# Patient Record
Sex: MALE | Race: WHITE | URBAN - METROPOLITAN AREA
[De-identification: names, ages, dates, MRNs, and addresses within clinical notes are randomized per-mention and may not be internally consistent; named-entity substitution may affect disease eponyms.]

---

## 2017-01-02 ENCOUNTER — MEDICAL CORRESPONDENCE (OUTPATIENT)
Dept: HEALTH INFORMATION MANAGEMENT | Facility: CLINIC | Age: 82
End: 2017-01-02

## 2017-01-04 ENCOUNTER — TRANSFERRED RECORDS (OUTPATIENT)
Dept: HEALTH INFORMATION MANAGEMENT | Facility: CLINIC | Age: 82
End: 2017-01-04

## 2017-03-27 ENCOUNTER — TELEPHONE (OUTPATIENT)
Dept: FAMILY MEDICINE | Facility: CLINIC | Age: 82
End: 2017-03-27

## 2017-03-27 DIAGNOSIS — I10 ESSENTIAL HYPERTENSION WITH GOAL BLOOD PRESSURE LESS THAN 140/90: ICD-10-CM

## 2017-03-27 DIAGNOSIS — I21.3 ACUTE ST ELEVATION MYOCARDIAL INFARCTION (STEMI), UNSPECIFIED ARTERY (H): ICD-10-CM

## 2017-03-27 RX ORDER — LISINOPRIL 2.5 MG/1
2.5 TABLET ORAL DAILY
Qty: 90 TABLET | Refills: 0 | Status: SHIPPED | OUTPATIENT
Start: 2017-03-27 | End: 2017-06-30

## 2017-03-27 NOTE — TELEPHONE ENCOUNTER
[facility]. Please call pt at the above #.

## 2017-03-27 NOTE — TELEPHONE ENCOUNTER
Called patient and patient stated he needs a paper perscription for lisinopril and that is it for now.     Rabia CASTILLO CMA ([facility])

## 2017-03-28 NOTE — TELEPHONE ENCOUNTER
Lisinopril prescription signed by Dr. Wright and mailed to patient's home address.  Patient called and informed. Selina Alfonso,

## 2017-04-04 ENCOUNTER — TRANSFERRED RECORDS (OUTPATIENT)
Dept: HEALTH INFORMATION MANAGEMENT | Facility: CLINIC | Age: 82
End: 2017-04-04

## 2017-04-07 ENCOUNTER — TELEPHONE (OUTPATIENT)
Dept: FAMILY MEDICINE | Facility: CLINIC | Age: 82
End: 2017-04-07

## 2017-04-07 NOTE — TELEPHONE ENCOUNTER
Pt & spouse are disabled, handicap cards are expiring for both. Pt would like to discuss renewing.

## 2017-04-07 NOTE — TELEPHONE ENCOUNTER
The form has been started and placed at the providers desk. Haleigh Myers     Spouse has not been seen by the provider since 2015.     Spouse will call and make an appointment to be seen and will discuss this at that time.

## 2017-04-07 NOTE — TELEPHONE ENCOUNTER
Called patient let know that form was done on are part and he will need to fill in the top portion he would like this mailed to the home address this was done.  Shonda Beatty,

## 2017-06-30 ENCOUNTER — TELEPHONE (OUTPATIENT)
Dept: FAMILY MEDICINE | Facility: CLINIC | Age: 82
End: 2017-06-30

## 2017-06-30 DIAGNOSIS — I10 ESSENTIAL HYPERTENSION WITH GOAL BLOOD PRESSURE LESS THAN 140/90: ICD-10-CM

## 2017-06-30 DIAGNOSIS — I21.3 ACUTE ST ELEVATION MYOCARDIAL INFARCTION (STEMI), UNSPECIFIED ARTERY (H): ICD-10-CM

## 2017-06-30 NOTE — TELEPHONE ENCOUNTER
Lisinopril   Last Written Prescription Date: 3/27/17  Last Fill Quantity: 90, # refills: 0  Last Office Visit with G, P or [facility] prescribing provider: 12/22/16       Potassium   Date Value Ref Range Status   12/22/2016 4.1 3.4 - 5.3 mmol/L Final     Creatinine   Date Value Ref Range Status   12/22/2016 1.62 (H) 0.66 - 1.25 mg/dL Final     BP Readings from Last 3 Encounters:   12/22/16 (!) 82/48   12/05/16 (!) 86/62   06/09/16 94/57

## 2017-07-03 NOTE — TELEPHONE ENCOUNTER
Routing refill request to provider for review/approval because:  Labs out of range:  Cr  Low BP's      Ruth Nichols, RN - [facility]

## 2017-07-03 NOTE — TELEPHONE ENCOUNTER
BP Readings from Last 3 Encounters:   12/22/16 (!) 82/48   12/05/16 (!) 86/62   06/09/16 94/57      Has cardiomyopathy, so blood pressure should be lower than normal  Has been 6 months since last visit  Recommend follow up with Dr. Wright now  Call to schedule, then can fill x 30 days  Fariha Montano MD

## 2017-07-03 NOTE — TELEPHONE ENCOUNTER
Reason for Call:  Other prescription    Detailed comments:  Patient calling on the status of refill.     Phone Number Patient can be reached at: Home number on file [phone] (home)    Best Time:  Any     Can we leave a detailed message on this number? YES    Call taken on 7/3/2017 at 10:12 AM by Angela Nolasco

## 2017-07-05 RX ORDER — LISINOPRIL 2.5 MG/1
TABLET ORAL
Qty: 30 TABLET | Refills: 0 | Status: SHIPPED | OUTPATIENT
Start: 2017-07-05 | End: 2017-07-20

## 2017-07-05 NOTE — TELEPHONE ENCOUNTER
Patient called and informed.  Appointment scheduled with Dr. Wright on Thursday, July 20th at 10:50 a.m.  Routing to RN to refill medication. Selina Alfonso,

## 2017-07-10 ENCOUNTER — TELEPHONE (OUTPATIENT)
Dept: FAMILY MEDICINE | Facility: CLINIC | Age: 82
End: 2017-07-10

## 2017-07-10 DIAGNOSIS — I10 HYPERTENSION GOAL BP (BLOOD PRESSURE) < 140/90: ICD-10-CM

## 2017-07-10 DIAGNOSIS — I50.9 CONGESTIVE HEART FAILURE, UNSPECIFIED CONGESTIVE HEART FAILURE CHRONICITY, UNSPECIFIED CONGESTIVE HEART FAILURE TYPE: Primary | ICD-10-CM

## 2017-07-10 NOTE — TELEPHONE ENCOUNTER
There's a problem with the furosemide [ Lasix ] - his medication administration record lists bumetanide (BUMEX), which is it ? If necessary download / request all recent [facility] progress notes. Maybe he needs this refill with [facility] [place] ?    Gianni Wright MD

## 2017-07-10 NOTE — TELEPHONE ENCOUNTER
furosemide (LASIX) 80 MG tablet     Last Written Prescription Date:  ?  Last Fill Quantity: ?,   # refills: ?  Last Office Visit with [facility], P or [facility] prescribing provider: 12-22-16  Future Office visit:    Next 5 appointments (look out 90 days)     Jul 20, 2017 10:50 AM CDT   Office Visit with Gianni Wright MD   [facility] ([facility])    [address]  [place] [address]   [phone]                   Routing refill request to provider for review/approval because:  Drug not active on patient's medication list

## 2017-07-10 NOTE — TELEPHONE ENCOUNTER
Reason for call:  Medication   If this is a refill request, has the caller requested the refill from the pharmacy already? No  Will the patient be using a [facility]? No  Name of the pharmacy and [facility] the [facility] [phone]    Name of the medication requested: Furosemide 8 MG, patient states Bumex is no longer working and went back to furosemide and it is working much better!    Other request: n/a    Phone number to reach patient:  Home number on file [phone] (home)    Best Time:  anytime    Can we leave a detailed message on this number?  YES

## 2017-07-11 RX ORDER — FUROSEMIDE 80 MG
80 TABLET ORAL DAILY
Qty: 90 TABLET | Refills: 0 | Status: SHIPPED | OUTPATIENT
Start: 2017-07-11 | End: 2017-07-20

## 2017-07-11 NOTE — TELEPHONE ENCOUNTER
Per patient note below:  patient states Bumex is no longer working and went back to furosemide and it is working much better!  Please advise, should RN recommend patient f/u with [facility]?  Belen Hummel RN

## 2017-07-11 NOTE — TELEPHONE ENCOUNTER
Well the choice of furosemide [ Lasix ] over bumetanide (BUMEX) is ok, they are both effective diuretics. But more to the point , how are things going ? I need to see patient or with [facility] [place] . Either way. Extend prescription times one month and schedule follow up either with me or with [facility] [place], it's his call.    Gianni Wright MD

## 2017-07-12 NOTE — PROGRESS NOTES
SUBJECTIVE:                                                    Juan Antonio Sierra is a 81 year old male who presents to clinic today for the following health issues:    Right Leg Edema/CHF -- Patient notes his right ankle are hard and swollen. In addition, his right toes feel numb. He states his sense of balance is bad, but he has  gained muscle back since last December 2016. Compliant with 5 MG Coumadin tiw. He notes he forces himself to do things, which he relates to feeling better.    Depression -- He notes he stopped taking Zoloft. In private, the wife pleaded with me that I convince him to start Zoloft again. However, the patient and I discussed the role of Zoloft in some detail, but he declined. There is some intrafamilial tension surrounding this issue and patient became animated during this discussion     Tobacco Cessation -- Patient states he has a severe desire to smoke, even though he has not smoked in two years.    Hypothyroidism -- Patient states when he started taking his thyroid medication he began losing hair and his nails became rigid. After a while his hair and nails returned to normal.  TSH   Date Value Ref Range Status   11/08/2016 2.75 0.40 - 4.00 mU/L Final   02/17/2016 5.38 (H) mcU/mL Final   11/19/2015 4.49 (H) 0.40 - 4.00 mU/L Final   10/06/2015 6.79 (H) 0.40 - 4.00 mU/L Final   06/04/2015 4.82 mcU/mL Final     Idiopathic Pulmonary Fibrosis -- He reports he does not use his oxygen but does have SOB. He has severe documented hypoxia    Problem list and histories reviewed & adjusted, as indicated.  Additional history: as documented    Patient Active Problem List   Diagnosis     Atrial fibrillation (H)     AMI (acute myocardial infarction) (H)     Arthritis     Hyperlipidemia LDL goal <100     Hypertension goal BP (blood pressure) < 140/90     S/P CABG (coronary artery bypass graft)     Health Care Home     Transient cerebral ischemia     Ex-smoker     Generalized weakness     Paraneoplastic  neuropathy (H)     Pulmonary nodule     High risk medications (not anticoagulants) long-term use     Congestive heart failure, unspecified congestive heart failure chronicity, unspecified congestive heart failure type (H)     Major depressive disorder, recurrent episode, moderate (H)     Other emphysema (H)     Ischemic cardiomyopathy     CKD (chronic kidney disease) stage 3, GFR 30-59 ml/min     IPF (idiopathic pulmonary fibrosis) (H)     Acquired hypothyroidism     Past Surgical History:   Procedure Laterality Date     CATHETER, ABLATION  July 2012    for atrial fibrillation      CORONARY ARTERY BYPASS  1983 (5v)  -  1998 (3v)    cabg @ [facility] and a redo     IMPLANT PACEMAKER  6/2012     Laminectomy - T11 to L5  aug 26th, 2015    [place]     STENT, CORONARY, TREE  6/3/2011    stenting of the LAD by the mammary artery graft in 2011     TONSILLECTOMY         Social History   Substance Use Topics     Smoking status: Former Smoker     Types: Pipe, Cigars     Start date: 1/1/1947     Quit date: 8/18/2015     Smokeless tobacco: Never Used     Alcohol use Yes      Comment: 0-1 drinks of brigitte in evening     Family History   Problem Relation Age of Onset     Respiratory Mother      chf     CEREBROVASCULAR DISEASE Mother 86     HEART DISEASE Father      Neurologic Disorder Sister      migraines/ unknown age         Current Outpatient Prescriptions   Medication Sig Dispense Refill     furosemide (LASIX) 80 MG tablet Take 1 tablet (80 mg) by mouth daily 90 tablet 1     lisinopril (PRINIVIL/ZESTRIL) 2.5 MG tablet Take 1 tablet (2.5 mg) by mouth daily 90 tablet 1     levothyroxine (SYNTHROID, LEVOTHROID) 75 MCG tablet TAKE 1 TABLET (75 MCG) BYMOUTH DAILY 90 tablet 2     carvedilol (COREG) 12.5 MG tablet Take 12.5 mg by mouth 2 times daily (with meals)       warfarin (COUMADIN) 5 MG tablet Take 1/2 tablet (2.5 mg ) daily or as directed by [facility] nurse 90 tablet 1     Ferrous Sulfate (IRON) 325 (65 FE) MG tablet Take 1 tablet by mouth  "daily       MAGNESIUM OXIDE PO        Nitroglycerin (NITROSTAT SL) Place 0.4 mg under the tongue       potassium chloride SA (K-DUR,KLOR-CON M) 20 MEQ tablet Take 1 tablet by mouth daily       fish oil-omega-3 fatty acids (FISH OIL) 1000 MG capsule Take 2 g by mouth daily       Cholecalciferol (VITAMIN D3) 2000 UNITS CAPS 1 daily       Cyanocobalamin (B-12) 2500 MCG TABS 1 daily       Multiple vitamin CAPS 1 daily       amiodarone (PACERONE/CODARONE) 200 MG tablet Take 100 mg by mouth daily        aspirin 81 MG tablet Take 1 tablet by mouth daily.       [DISCONTINUED] furosemide (LASIX) 80 MG tablet Take 1 tablet (80 mg) by mouth daily 90 tablet 0     [DISCONTINUED] lisinopril (PRINIVIL/ZESTRIL) 2.5 MG tablet TAKE 1 TABLET BY MOUTH ONCE DAILY 30 tablet 0     Labs reviewed in EPIC      Reviewed and updated as needed this visit by clinical staff  Tobacco  Allergies  Meds  Med Hx  Surg Hx  Fam Hx  Soc Hx      Reviewed and updated as needed this visit by Provider         ROS:  Constitutional, HEENT, cardiovascular, pulmonary, GI, , musculoskeletal, neuro, skin, endocrine and psych systems are negative, except as otherwise noted.    This document serves as a record of the services and decisions personally performed and made by Gianni Wright MD. It was created on their behalf by Robby Masrhall, a trained medical scribe. The creation of this document is based the provider's statements to the medical scribe.  Robby Marshall July 20, 2017 11:17 AM    OBJECTIVE:   /80 (BP Location: Right arm, Cuff Size: Adult Regular)  Pulse 81  Temp 97  F (36.1  C) (Oral)  Ht 1.753 m (5' 9\")  Wt 75.4 kg (166 lb 3.2 oz)  SpO2 96%  BMI 24.54 kg/m2  Body mass index is 24.54 kg/(m^2).  GENERAL: healthy, alert and no distress  EYES: Eyes grossly normal to inspection, PERRL and conjunctivae and sclerae normal  RESP: expiratory wheezing, more pronounced at the bases  CV: apparently regular rate and rhythm, normal S1 S2, no S3 " or S4, no murmur, click or rub, no peripheral edema and peripheral pulses strong, distant heart tones   SKIN: no suspicious lesions or rashes to visible skin  NEURO: mentation intact and speech normal  PSYCH: mentation appears normal, affect normal/bright    Diagnostic Test Results:  Results for orders placed or performed in visit on 07/20/17 (from the past 24 hour(s))   CBC with platelets differential   Result Value Ref Range    WBC 6.9 4.0 - 11.0 10e9/L    RBC Count 3.68 (L) 4.4 - 5.9 10e12/L    Hemoglobin 12.6 (L) 13.3 - 17.7 g/dL    Hematocrit 37.6 (L) 40.0 - 53.0 %     (H) 78 - 100 fl    MCH 34.2 (H) 26.5 - 33.0 pg    MCHC 33.5 31.5 - 36.5 g/dL    RDW 14.0 10.0 - 15.0 %    Platelet Count 152 150 - 450 10e9/L    Diff Method Automated Method     % Neutrophils 65.8 %    % Lymphocytes 18.2 %    % Monocytes 12.1 %    % Eosinophils 3.5 %    % Basophils 0.4 %    Absolute Neutrophil 4.6 1.6 - 8.3 10e9/L    Absolute Lymphocytes 1.3 0.8 - 5.3 10e9/L    Absolute Monocytes 0.8 0.0 - 1.3 10e9/L    Absolute Eosinophils 0.2 0.0 - 0.7 10e9/L    Absolute Basophils 0.0 0.0 - 0.2 10e9/L       ASSESSMENT/PLAN:     (I50.9) Congestive heart failure, unspecified congestive heart failure chronicity, unspecified congestive heart failure type (H)  (primary encounter diagnosis)  Comment: severe ischemic cardiomyopathy  And is clinging to life at this point   Plan: furosemide (LASIX) 80 MG tablet, Comprehensive         metabolic panel        See office visit notes with [facility] [place]     (I10) Hypertension goal BP (blood pressure) < 140/90  Comment: controlled within acceptable limits   Plan: furosemide (LASIX) 80 MG tablet, Comprehensive         metabolic panel            (I21.3) Acute ST elevation myocardial infarction (STEMI), unspecified artery (H)  Comment: free of anginal symptoms at this time at this point   Plan: lisinopril (PRINIVIL/ZESTRIL) 2.5 MG tablet            (E78.5) Hyperlipidemia LDL  goal <100  Comment: limited data as to efficacy of treatment with lipid lowering therapy with statin medication at this point   Plan: Lipid panel reflex to direct LDL            (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,  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)  Comment: this is a chronic issue and has been seen at [facility]   Plan: CBC with platelets differential, Comprehensive         metabolic panel            (E03.9) Acquired hypothyroidism  Comment: recheck   Plan: TSH with free T4 reflex            (N18.3) CKD (chronic kidney disease) stage 3, GFR 30-59 ml/min  Comment: ongoing monitoring    Plan: Comprehensive metabolic panel            (J43.8) Other emphysema (H)  Comment: continue current plan of care     Plan: Comprehensive metabolic panel              Patient Instructions     - I will follow up with you about lab results.     - Follow up with me in 6 months.     [facility]    If you have any questions regarding to your visit please contact your care team:     Team Pink:   Clinic Hours Telephone Number   Internal Medicine:  Dr. Raina Tran, NP       7am-7pm  Monday - Thursday   7am-5pm  Fridays  [phone]  (Appointment scheduling available 24/7)    Questions about your visit?  Team Line  [phone]   Urgent Care - [place] - 11am-9pm Monday-Friday Saturday-Sunday- 9am-5pm   [place] - 5pm-9pm Monday-Friday Saturday-Sunday- 9am-5pm  [phone] - [place]   [phone] - [place]       What options do I have for visits at the clinic other than the traditional office visit?  To expand how we care for you, many of our providers are utilizing electronic visits (e-visits) and telephone visits, when medically appropriate, for interactions with their patients rather than a visit in the clinic.   We also offer nurse visits for many medical concerns. Just like any other service, we will bill your insurance company for this type of visit based on time  spent on the phone with your provider. Not all insurance companies cover these visits. Please check with your medical insurance if this type of visit is covered. You will be responsible for any charges that are not paid by your insurance.      E-visits via [email or web address]:  generally incur a $35.00 fee.  Telephone visits:  Time spent on the phone: *charged based on time that is spent on the phone in increments of 10 minutes. Estimated cost:   5-10 mins $30.00   11-20 mins. $59.00   21-30 mins. $85.00   Use Outlisten (secure email communication and access to your chart) to send your primary care provider a message or make an appointment. Ask someone on your Team how to sign up for Outlisten.    For a Price Quote for your services, please call our Consumer Price Line at [phone].    As always, Thank you for trusting us with your health care needs!    Melissa Nails MA      The information in this document, created by the medical scribe for me, accurately reflects the services I personally performed and the decisions made by me. I have reviewed and approved this document for accuracy.   MD Gianni Crook MD  [facility]

## 2017-07-19 ENCOUNTER — TELEPHONE (OUTPATIENT)
Dept: FAMILY MEDICINE | Facility: CLINIC | Age: 82
End: 2017-07-19

## 2017-07-19 NOTE — TELEPHONE ENCOUNTER
Reason for Call:  Other call back    Detailed comments: patient would like to know if he should Fast for any labs due at the time of his scheduled appt 07/20/17 please call to discuss further    Phone Number Patient can be reached at: Home number on file [phone] (home)    Best Time: today    Can we leave a detailed message on this number? YES    Call taken on 7/19/2017 at 10:29 AM by Marysol Pickett

## 2017-07-19 NOTE — TELEPHONE ENCOUNTER
Provider not in office today  But per health maintenance, No fasting labs are due yet.      Feroz Ling RN

## 2017-07-20 ENCOUNTER — OFFICE VISIT (OUTPATIENT)
Dept: FAMILY MEDICINE | Facility: CLINIC | Age: 82
End: 2017-07-20
Payer: COMMERCIAL

## 2017-07-20 VITALS
SYSTOLIC BLOOD PRESSURE: 112 MMHG | DIASTOLIC BLOOD PRESSURE: 80 MMHG | OXYGEN SATURATION: 96 % | BODY MASS INDEX: 24.62 KG/M2 | HEART RATE: 81 BPM | TEMPERATURE: 97 F | HEIGHT: 69 IN | WEIGHT: 166.2 LBS

## 2017-07-20 DIAGNOSIS — J43.8 OTHER EMPHYSEMA (H): ICD-10-CM

## 2017-07-20 DIAGNOSIS — I10 HYPERTENSION GOAL BP (BLOOD PRESSURE) < 140/90: ICD-10-CM

## 2017-07-20 DIAGNOSIS — G13.0 PARANEOPLASTIC NEUROPATHY (H): ICD-10-CM

## 2017-07-20 DIAGNOSIS — I21.3 ACUTE ST ELEVATION MYOCARDIAL INFARCTION (STEMI), UNSPECIFIED ARTERY (H): ICD-10-CM

## 2017-07-20 DIAGNOSIS — N18.30 CKD (CHRONIC KIDNEY DISEASE) STAGE 3, GFR 30-59 ML/MIN (H): ICD-10-CM

## 2017-07-20 DIAGNOSIS — E78.5 HYPERLIPIDEMIA LDL GOAL <100: ICD-10-CM

## 2017-07-20 DIAGNOSIS — I50.9 CONGESTIVE HEART FAILURE, UNSPECIFIED CONGESTIVE HEART FAILURE CHRONICITY, UNSPECIFIED CONGESTIVE HEART FAILURE TYPE: Primary | ICD-10-CM

## 2017-07-20 DIAGNOSIS — E03.9 ACQUIRED HYPOTHYROIDISM: ICD-10-CM

## 2017-07-20 DIAGNOSIS — D49.9 PARANEOPLASTIC NEUROPATHY (H): ICD-10-CM

## 2017-07-20 LAB
ALBUMIN SERPL-MCNC: 3.6 G/DL (ref 3.4–5)
ALP SERPL-CCNC: 87 U/L (ref 40–150)
ALT SERPL W P-5'-P-CCNC: 24 U/L (ref 0–70)
ANION GAP SERPL CALCULATED.3IONS-SCNC: 5 MMOL/L (ref 3–14)
AST SERPL W P-5'-P-CCNC: 30 U/L (ref 0–45)
BASOPHILS # BLD AUTO: 0 10E9/L (ref 0–0.2)
BASOPHILS NFR BLD AUTO: 0.4 %
BILIRUB SERPL-MCNC: 0.8 MG/DL (ref 0.2–1.3)
BUN SERPL-MCNC: 29 MG/DL (ref 7–30)
CALCIUM SERPL-MCNC: 9 MG/DL (ref 8.5–10.1)
CHLORIDE SERPL-SCNC: 102 MMOL/L (ref 94–109)
CHOLEST SERPL-MCNC: 215 MG/DL
CO2 SERPL-SCNC: 31 MMOL/L (ref 20–32)
CREAT SERPL-MCNC: 1.08 MG/DL (ref 0.66–1.25)
DIFFERENTIAL METHOD BLD: ABNORMAL
EOSINOPHIL # BLD AUTO: 0.2 10E9/L (ref 0–0.7)
EOSINOPHIL NFR BLD AUTO: 3.5 %
ERYTHROCYTE [DISTWIDTH] IN BLOOD BY AUTOMATED COUNT: 14 % (ref 10–15)
GFR SERPL CREATININE-BSD FRML MDRD: 66 ML/MIN/1.7M2
GLUCOSE SERPL-MCNC: 80 MG/DL (ref 70–99)
HCT VFR BLD AUTO: 37.6 % (ref 40–53)
HDLC SERPL-MCNC: 48 MG/DL
HGB BLD-MCNC: 12.6 G/DL (ref 13.3–17.7)
LDLC SERPL CALC-MCNC: 142 MG/DL
LYMPHOCYTES # BLD AUTO: 1.3 10E9/L (ref 0.8–5.3)
LYMPHOCYTES NFR BLD AUTO: 18.2 %
MCH RBC QN AUTO: 34.2 PG (ref 26.5–33)
MCHC RBC AUTO-ENTMCNC: 33.5 G/DL (ref 31.5–36.5)
MCV RBC AUTO: 102 FL (ref 78–100)
MONOCYTES # BLD AUTO: 0.8 10E9/L (ref 0–1.3)
MONOCYTES NFR BLD AUTO: 12.1 %
NEUTROPHILS # BLD AUTO: 4.6 10E9/L (ref 1.6–8.3)
NEUTROPHILS NFR BLD AUTO: 65.8 %
NONHDLC SERPL-MCNC: 167 MG/DL
PLATELET # BLD AUTO: 152 10E9/L (ref 150–450)
POTASSIUM SERPL-SCNC: 4 MMOL/L (ref 3.4–5.3)
PROT SERPL-MCNC: 7.9 G/DL (ref 6.8–8.8)
RBC # BLD AUTO: 3.68 10E12/L (ref 4.4–5.9)
SODIUM SERPL-SCNC: 138 MMOL/L (ref 133–144)
TRIGL SERPL-MCNC: 125 MG/DL
TSH SERPL DL<=0.005 MIU/L-ACNC: 2.29 MU/L (ref 0.4–4)
WBC # BLD AUTO: 6.9 10E9/L (ref 4–11)

## 2017-07-20 PROCEDURE — 80061 LIPID PANEL: CPT | Performed by: INTERNAL MEDICINE

## 2017-07-20 PROCEDURE — 80050 GENERAL HEALTH PANEL: CPT | Performed by: INTERNAL MEDICINE

## 2017-07-20 PROCEDURE — 99214 OFFICE O/P EST MOD 30 MIN: CPT | Performed by: INTERNAL MEDICINE

## 2017-07-20 PROCEDURE — 36415 COLL VENOUS BLD VENIPUNCTURE: CPT | Performed by: INTERNAL MEDICINE

## 2017-07-20 RX ORDER — FUROSEMIDE 80 MG
80 TABLET ORAL DAILY
Qty: 90 TABLET | Refills: 1 | Status: SHIPPED | OUTPATIENT
Start: 2017-07-20 | End: 2018-08-03

## 2017-07-20 RX ORDER — LISINOPRIL 2.5 MG/1
2.5 TABLET ORAL DAILY
Qty: 90 TABLET | Refills: 1 | Status: SHIPPED | OUTPATIENT
Start: 2017-07-20 | End: 2018-02-02

## 2017-07-20 ASSESSMENT — PAIN SCALES - GENERAL: PAINLEVEL: NO PAIN (0)

## 2017-07-20 NOTE — PATIENT INSTRUCTIONS
- I will follow up with you about lab results.     - Follow up with me in 6 months.     [facility]    If you have any questions regarding to your visit please contact your care team:     Team Pink:   Clinic Hours Telephone Number   Internal Medicine:  Dr. Raina Tran, NP       7am-7pm  Monday - Thursday   7am-5pm  Fridays  [phone]  (Appointment scheduling available 24/7)    Questions about your visit?  Team Line  [phone]   [facility] [place] [facility] 11am-9pm Monday-Friday Saturday-Sunday- 9am-5pm   [place] - 5pm-9pm Monday-Friday Saturday-Sunday- 9am-5pm  [phone] - [place]   [phone] - [place]       What options do I have for visits at the clinic other than the traditional office visit?  To expand how we care for you, many of our providers are utilizing electronic visits (e-visits) and telephone visits, when medically appropriate, for interactions with their patients rather than a visit in the clinic.   We also offer nurse visits for many medical concerns. Just like any other service, we will bill your insurance company for this type of visit based on time spent on the phone with your provider. Not all insurance companies cover these visits. Please check with your medical insurance if this type of visit is covered. You will be responsible for any charges that are not paid by your insurance.      E-visits via [employer]:  generally incur a $35.00 fee.  Telephone visits:  Time spent on the phone: *charged based on time that is spent on the phone in increments of 10 minutes. Estimated cost:   5-10 mins $30.00   11-20 mins. $59.00   21-30 mins. $85.00   Use WizIQt (secure email communication and access to your chart) to send your primary care provider a message or make an appointment. Ask someone on your Team how to sign up for [employer].    For a Price Quote for your services, please call our Consumer Price Line at  [phone].    As always, Thank you for trusting us with your health care needs!    Melissa Nails MA

## 2017-07-20 NOTE — MR AVS SNAPSHOT
After Visit Summary   7/20/2017    Juan Antonio Sierra    MRN: [id number]           Patient Information     Date Of Birth          1935        Visit Information        Provider Department      7/20/2017 10:50 AM Gianni Wright MD [facility]        Today's Diagnoses     Congestive heart failure, unspecified congestive heart failure chronicity, unspecified congestive heart failure type (H)    -  1    Hypertension goal BP (blood pressure) < 140/90        Acute ST elevation myocardial infarction (STEMI), unspecified artery (H)        Hyperlipidemia LDL goal <100        Paraneoplastic neuropathy (H)        Acquired hypothyroidism        CKD (chronic kidney disease) stage 3, GFR 30-59 ml/min        Other emphysema (H)          Care Instructions    - I will follow up with you about lab results.     - Follow up with me in 6 months.     [facility]    If you have any questions regarding to your visit please contact your care team:     Team Pink:   Clinic Hours Telephone Number   Internal Medicine:  Dr. Raina Tran NP       7am-7pm  Monday - Thursday   7am-5pm  Fridays  [phone]  (Appointment scheduling available 24/7)    Questions about your visit?  Team Line  [phone]   Urgent Care - [place] and [place] - 11am-9pm Monday-Friday Saturday-Sunday- 9am-5pm   [place] - 5pm-9pm Monday-Friday Saturday-Sunday- 9am-5pm  [phone] - Ashley   [phone] - [place]       What options do I have for visits at the clinic other than the traditional office visit?  To expand how we care for you, many of our providers are utilizing electronic visits (e-visits) and telephone visits, when medically appropriate, for interactions with their patients rather than a visit in the clinic.   We also offer nurse visits for many medical concerns. Just like any other service, we will bill your insurance company for this type of visit  based on time spent on the phone with your provider. Not all insurance companies cover these visits. Please check with your medical insurance if this type of visit is covered. You will be responsible for any charges that are not paid by your insurance.      E-visits via [employer]:  generally incur a $35.00 fee.  Telephone visits:  Time spent on the phone: *charged based on time that is spent on the phone in increments of 10 minutes. Estimated cost:   5-10 mins $30.00   11-20 mins. $59.00   21-30 mins. $85.00   Use [employer] (secure email communication and access to your chart) to send your primary care provider a message or make an appointment. Ask someone on your Team how to sign up for [employer].    For a Price Quote for your services, please call our [employer] Line at [phone].    As always, Thank you for trusting us with your health care needs!    Melissa Nails MA            Follow-ups after your visit        Who to contact     If you have questions or need follow up information about today's clinic visit or your schedule please contact [facility] directly at [phone].  Normal or non-critical lab and imaging results will be communicated to you by [employer], letter or phone within 4 business days after the clinic has received the results. If you do not hear from us within 7 days, please contact the clinic through [employer] or phone. If you have a critical or abnormal lab result, we will notify you by phone as soon as possible.  Submit refill requests through [employer] or call your pharmacy and they will forward the refill request to us. Please allow 3 business days for your refill to be completed.          Additional Information About Your Visit        [employer] Information     [employer] gives you secure access to your electronic health record. If you see a primary care provider, you can also send messages to your care team and make appointments. If you have questions, please call your primary care  "clinic.  If you do not have a primary care provider, please call [phone] and they will assist you.        Care EveryWhere ID     This is your Care EveryWhere ID. This could be used by other organizations to access your [place] medical records  [id number]        Your Vitals Were     Pulse Temperature Height Pulse Oximetry BMI (Body Mass Index)       81 97  F (36.1  C) (Oral) 5' 9\" (1.753 m) 96% 24.54 kg/m2        Blood Pressure from Last 3 Encounters:   07/20/17 112/80   12/22/16 (!) 82/48   12/05/16 (!) 86/62    Weight from Last 3 Encounters:   07/20/17 166 lb 3.2 oz (75.4 kg)   12/22/16 156 lb 6.4 oz (70.9 kg)   06/09/16 171 lb 6.4 oz (77.7 kg)              We Performed the Following     CBC with platelets differential     Comprehensive metabolic panel     Lipid panel reflex to direct LDL     TSH with free T4 reflex          Today's Medication Changes          These changes are accurate as of: 7/20/17 11:25 AM.  If you have any questions, ask your nurse or doctor.               These medicines have changed or have updated prescriptions.        Dose/Directions    lisinopril 2.5 MG tablet   Commonly known as:  PRINIVIL/Zestril   This may have changed:  See the new instructions.   Used for:  Acute ST elevation myocardial infarction (STEMI), unspecified artery (H)   Changed by:  Gianni Wright MD        Dose:  2.5 mg   Take 1 tablet (2.5 mg) by mouth daily   Quantity:  90 tablet   Refills:  1            Where to get your medicines      These medications were sent to [id number] - [place] [address] [address] [address] [address]    Hours:  test fax successful 4/5/04  Phone:  [phone]     furosemide 80 MG tablet    lisinopril 2.5 MG tablet                Primary Care Provider Office Phone # Fax #    Gianni Wright [phone]       [address] [address]        Equal Access to Services     RADHA GILLIS AH: " Hadii aad raghav wattswilliamo Sojose rali, waaxda luqadaha, qaybta kaalsena cantu, edgard beatricemacie richmond. So [facility] [phone].    ATENCIÓN: Si rody swenson, tiene a ness disposición servicios gratuitos de asistencia lingüística. Gracielaame al [phone].    We comply with applicable federal civil rights laws and [place] laws. We do not discriminate on the basis of race, color, national origin, age, disability sex, sexual orientation or gender identity.            Thank you!     Thank you for choosing [facility]  for your care. Our goal is always to provide you with excellent care. Hearing back from our patients is one way we can continue to improve our services. Please take a few minutes to complete the written survey that you may receive in the mail after your visit with us. Thank you!             Your Updated Medication List - Protect others around you: Learn how to safely use, store and throw away your medicines at www.disposemymeds.org.          This list is accurate as of: 7/20/17 11:25 AM.  Always use your most recent med list.                   Brand Name Dispense Instructions for use Diagnosis    amiodarone 200 MG tablet    PACERONE/CODARONE     Take 100 mg by mouth daily        aspirin 81 MG tablet      Take 1 tablet by mouth daily.        B-12 2500 MCG Tabs      1 daily        carvedilol 12.5 MG tablet    COREG     Take 12.5 mg by mouth 2 times daily (with meals)        fish oil-omega-3 fatty acids 1000 MG capsule      Take 2 g by mouth daily        furosemide 80 MG tablet    LASIX    90 tablet    Take 1 tablet (80 mg) by mouth daily    Congestive heart failure, unspecified congestive heart failure chronicity, unspecified congestive heart failure type (H), Hypertension goal BP (blood pressure) < 140/90       iron 325 (65 FE) MG tablet      Take 1 tablet by mouth daily        levothyroxine 75 MCG tablet    SYNTHROID/LEVOTHROID    90 tablet    TAKE 1 TABLET (75 MCG) BYMOUTH DAILY     Hypothyroidism, unspecified type       lisinopril 2.5 MG tablet    PRINIVIL/Zestril    90 tablet    Take 1 tablet (2.5 mg) by mouth daily    Acute ST elevation myocardial infarction (STEMI), unspecified artery (H)       MAGNESIUM OXIDE PO           Multiple vitamin Caps      1 daily        NITROSTAT SL      Place 0.4 mg under the tongue        potassium chloride SA 20 MEQ CR tablet    K-DUR/KLOR-CON M     Take 1 tablet by mouth daily        vitamin D3 2000 UNITS Caps      1 daily        warfarin 5 MG tablet    COUMADIN    90 tablet    Take 1/2 tablet (2.5 mg ) daily or as directed by [facility] nurse    Atrial fibrillation (H)

## 2017-07-20 NOTE — NURSING NOTE
"Chief Complaint   Patient presents with     RECHECK     discuss thyroid     Recheck Medication     lisinopril     Swelling     left ankle swelling, bilateral feet numbness       Initial /80 (BP Location: Right arm, Cuff Size: Adult Regular)  Pulse 81  Temp 97  F (36.1  C) (Oral)  Ht 5' 9\" (1.753 m)  Wt 166 lb 3.2 oz (75.4 kg)  SpO2 96%  BMI 24.54 kg/m2 Estimated body mass index is 24.54 kg/(m^2) as calculated from the following:    Height as of this encounter: 5' 9\" (1.753 m).    Weight as of this encounter: 166 lb 3.2 oz (75.4 kg).  Medication Reconciliation: complete       Rafia Leach CMA      "

## 2017-07-20 NOTE — LETTER
[address] [place] [address]      July 21, 2017      Juan Antonio Sierra  [address]  [place] [address]          Dear Juan Antonio,  All of these tests are within acceptable limits . No worrisome findings ! Please let me know if you have any questions for me about all of these lab tests. Take care Capo.  Results for orders placed or performed in visit on 07/20/17   TSH with free T4 reflex   Result Value Ref Range    TSH 2.29 0.40 - 4.00 mU/L   Lipid panel reflex to direct LDL   Result Value Ref Range    Cholesterol 215 (H) <200 mg/dL    Triglycerides 125 <150 mg/dL    HDL Cholesterol 48 >39 mg/dL    LDL Cholesterol Calculated 142 (H) <100 mg/dL    Non HDL Cholesterol 167 (H) <130 mg/dL   CBC with platelets differential   Result Value Ref Range    WBC 6.9 4.0 - 11.0 10e9/L    RBC Count 3.68 (L) 4.4 - 5.9 10e12/L    Hemoglobin 12.6 (L) 13.3 - 17.7 g/dL    Hematocrit 37.6 (L) 40.0 - 53.0 %     (H) 78 - 100 fl    MCH 34.2 (H) 26.5 - 33.0 pg    MCHC 33.5 31.5 - 36.5 g/dL    RDW 14.0 10.0 - 15.0 %    Platelet Count 152 150 - 450 10e9/L    Diff Method Automated Method     % Neutrophils 65.8 %    % Lymphocytes 18.2 %    % Monocytes 12.1 %    % Eosinophils 3.5 %    % Basophils 0.4 %    Absolute Neutrophil 4.6 1.6 - 8.3 10e9/L    Absolute Lymphocytes 1.3 0.8 - 5.3 10e9/L    Absolute Monocytes 0.8 0.0 - 1.3 10e9/L    Absolute Eosinophils 0.2 0.0 - 0.7 10e9/L    Absolute Basophils 0.0 0.0 - 0.2 10e9/L   Comprehensive metabolic panel   Result Value Ref Range    Sodium 138 133 - 144 mmol/L    Potassium 4.0 3.4 - 5.3 mmol/L    Chloride 102 94 - 109 mmol/L    Carbon Dioxide 31 20 - 32 mmol/L    Anion Gap 5 3 - 14 mmol/L    Glucose 80 70 - 99 mg/dL    Urea Nitrogen 29 7 - 30 mg/dL    Creatinine 1.08 0.66 - 1.25 mg/dL    GFR Estimate 66 >60 mL/min/1.7m2    GFR Estimate If Black 79 >60 mL/min/1.7m2    Calcium 9.0 8.5 -  10.1 mg/dL    Bilirubin Total 0.8 0.2 - 1.3 mg/dL    Albumin 3.6 3.4 - 5.0 g/dL    Protein Total 7.9 6.8 - 8.8 g/dL    Alkaline Phosphatase 87 40 - 150 U/L    ALT 24 0 - 70 U/L    AST 30 0 - 45 U/L       If you have any questions or concerns, please me or my clinic team at [phone].      Sincerely,        Gianni Wright MD/bt

## 2017-07-21 ASSESSMENT — ANXIETY QUESTIONNAIRES
6. BECOMING EASILY ANNOYED OR IRRITABLE: MORE THAN HALF THE DAYS
3. WORRYING TOO MUCH ABOUT DIFFERENT THINGS: NOT AT ALL
1. FEELING NERVOUS, ANXIOUS, OR ON EDGE: NEARLY EVERY DAY
7. FEELING AFRAID AS IF SOMETHING AWFUL MIGHT HAPPEN: NEARLY EVERY DAY
GAD7 TOTAL SCORE: 13
2. NOT BEING ABLE TO STOP OR CONTROL WORRYING: MORE THAN HALF THE DAYS
5. BEING SO RESTLESS THAT IT IS HARD TO SIT STILL: NOT AT ALL

## 2017-07-21 ASSESSMENT — PATIENT HEALTH QUESTIONNAIRE - PHQ9: 5. POOR APPETITE OR OVEREATING: NEARLY EVERY DAY

## 2017-07-22 ASSESSMENT — ANXIETY QUESTIONNAIRES: GAD7 TOTAL SCORE: 13

## 2017-07-22 ASSESSMENT — PATIENT HEALTH QUESTIONNAIRE - PHQ9: SUM OF ALL RESPONSES TO PHQ QUESTIONS 1-9: 8

## 2017-09-06 ENCOUNTER — TRANSFERRED RECORDS (OUTPATIENT)
Dept: HEALTH INFORMATION MANAGEMENT | Facility: CLINIC | Age: 82
End: 2017-09-06

## 2017-09-09 DIAGNOSIS — E03.9 HYPOTHYROIDISM, UNSPECIFIED TYPE: ICD-10-CM

## 2017-09-11 RX ORDER — LEVOTHYROXINE SODIUM 75 UG/1
TABLET ORAL
Qty: 90 TABLET | Refills: 2 | Status: SHIPPED | OUTPATIENT
Start: 2017-09-11 | End: 2018-08-03

## 2017-09-11 NOTE — TELEPHONE ENCOUNTER
Prescription approved per [facility] Refill Protocol.  Aletha Rae RN

## 2017-09-14 ENCOUNTER — TELEPHONE (OUTPATIENT)
Dept: INTERNAL MEDICINE | Facility: CLINIC | Age: 82
End: 2017-09-14

## 2017-09-14 DIAGNOSIS — R20.0 BILATERAL HAND NUMBNESS: Primary | ICD-10-CM

## 2017-09-14 NOTE — TELEPHONE ENCOUNTER
He's welcomed to come in for an orthopedic consultation regarding this problem.    See orders section of this encounter     Gianni Wright MD

## 2017-09-14 NOTE — TELEPHONE ENCOUNTER
Reason for Call:  Other Referral    Detailed comments: Patient states carpal tunnel has returned in both hands and would like to discuss getting a referral from you. Please call.    Phone Number Patient can be reached at: Home number on file [phone] (home)    Best Time: any    Can we leave a detailed message on this number? YES    Call taken on 9/14/2017 at 12:10 PM by Debbie Roa

## 2017-09-14 NOTE — TELEPHONE ENCOUNTER
Patient informed of orthopedic referral and number given. Patient had no further questions or concerns at this time.    Judith Tse RN

## 2017-09-14 NOTE — TELEPHONE ENCOUNTER
Patient requesting referral for his Carpal Tunnel that has returned in both hands.  Please advise   Belen Hummel RN

## 2017-09-19 ENCOUNTER — OFFICE VISIT (OUTPATIENT)
Dept: ORTHOPEDICS | Facility: CLINIC | Age: 82
End: 2017-09-19
Payer: COMMERCIAL

## 2017-09-19 VITALS — TEMPERATURE: 98.3 F | WEIGHT: 155 LBS | BODY MASS INDEX: 22.96 KG/M2 | RESPIRATION RATE: 18 BRPM | HEIGHT: 69 IN

## 2017-09-19 DIAGNOSIS — R20.0 BILATERAL HAND NUMBNESS: Primary | ICD-10-CM

## 2017-09-19 PROCEDURE — 99202 OFFICE O/P NEW SF 15 MIN: CPT | Performed by: ORTHOPAEDIC SURGERY

## 2017-09-19 NOTE — LETTER
9/19/2017         RE: Juan Antonio Sierra  [address]  [place] [address]        Dear Colleague,    Thank you for referring your patient, Juan Antonio Sierra, to the [facility]. Please see a copy of my visit note below.    Juan Antonio Sierra is a 81 year old male who is seen in consultation at the request of Dr. Gianni Wright  for complaint of numbness of lateral aspect of bilateral hand, especially with use of the hand and at night. Pain is achey and throbbing.  He has had symptoms for 6 months.  Small finger is not involved.  Prior treatment used: Wrist splint - no.  NSAID: - no, he is on warfarin.  He had bilateral carpal tunnel release in 1988 by Dr. Moshe Timmons.  He has also had numbness of right toes.  Had surgery for lumbar spinal stenosis in 2014.    Employment: retired.. This is not work related.      PAST MEDICAL HISTORY:  Diabetes mellitus negative, hypothyroid negative.    EMG has not been performed.      Past Medical History:   Diagnosis Date     AMI (acute myocardial infarction) (H)     X2     Arthritis      Atrial fibrillation (H)      CABG (coronary artery bypass graft) 1983, 1998    first a 5 v cabg, then a 3v cabg     CHF (congestive heart failure) (H)      Hyperlipidemia LDL goal <100      Hypertension goal BP (blood pressure) < 140/90      Moderate major depression (H)      Pacemaker 05/2011       Past Surgical History:   Procedure Laterality Date     CATHETER, ABLATION  July 2012    for atrial fibrillation      CORONARY ARTERY BYPASS  1983 (5v)  -  1998 (3v)    cabg @ [facility] and a redo     IMPLANT PACEMAKER  6/2012     Laminectomy - T11 to L5  aug 26th, 2015    [place]     STENT, CORONARY, TREE  6/3/2011    stenting of the LAD by the mammary artery graft in 2011     TONSILLECTOMY         Family History   Problem Relation Age of Onset     Respiratory Mother      chf     CEREBROVASCULAR DISEASE Mother 86     HEART DISEASE Father      Neurologic Disorder Sister       migraines/ unknown age       Social History     Social History     Marital status:      Spouse name: Maureen     Number of children: 3     Years of education: 15     Occupational History     Computer programer Retired     1999     Social History Main Topics     Smoking status: Former Smoker     Types: Pipe, Cigars     Start date: 1/1/1947     Quit date: 8/18/2015     Smokeless tobacco: Never Used     Alcohol use Yes      Comment: 0-1 drinks of brigitte in evening     Drug use: No     Sexual activity: Not on file     Other Topics Concern     Not on file     Social History Narrative       Current Outpatient Prescriptions   Medication Sig Dispense Refill     levothyroxine (SYNTHROID/LEVOTHROID) 75 MCG tablet TAKE 1 TABLET BY MOUTH ONCE DAILY 90 tablet 2     furosemide (LASIX) 80 MG tablet Take 1 tablet (80 mg) by mouth daily 90 tablet 1     lisinopril (PRINIVIL/ZESTRIL) 2.5 MG tablet Take 1 tablet (2.5 mg) by mouth daily 90 tablet 1     carvedilol (COREG) 12.5 MG tablet Take 12.5 mg by mouth 2 times daily (with meals)       warfarin (COUMADIN) 5 MG tablet Take 1/2 tablet (2.5 mg ) daily or as directed by [facility] nurse 90 tablet 1     Ferrous Sulfate (IRON) 325 (65 FE) MG tablet Take 1 tablet by mouth daily       MAGNESIUM OXIDE PO        Nitroglycerin (NITROSTAT SL) Place 0.4 mg under the tongue       potassium chloride SA (K-DUR,KLOR-CON M) 20 MEQ tablet Take 1 tablet by mouth daily       fish oil-omega-3 fatty acids (FISH OIL) 1000 MG capsule Take 2 g by mouth daily       Cholecalciferol (VITAMIN D3) 2000 UNITS CAPS 1 daily       Cyanocobalamin (B-12) 2500 MCG TABS 1 daily       Multiple vitamin CAPS 1 daily       amiodarone (PACERONE/CODARONE) 200 MG tablet Take 100 mg by mouth daily        aspirin 81 MG tablet Take 1 tablet by mouth daily.         Allergies   Allergen Reactions     Hay Fever & [A.R.M.]        REVIEW OF SYSTEMS:  CONSTITUTIONAL:  NEGATIVE for fever, chills, change in weight, not feeling  "tired  SKIN:  NEGATIVE for worrisome rashes, no skin lumps, no skin ulcers and no non-healing wounds  EYES:  NEGATIVE for vision changes or irritation.  ENT/MOUTH:  NEGATIVE.  No hearing loss, no hoarseness, no difficulty swallowing.  RESP:  NEGATIVE. No cough or shortness of breath.  BREAST:  NEGATIVE for masses, tenderness or discharge  CV:  NEGATIVE for chest pain, palpitations or peripheral edema  GI:  NEGATIVE for nausea, abdominal pain, heartburn, or change in bowel habits  :  Negative. No dysuria, no hematuria  MUSCULOSKELETAL:  See HPI above  NEURO:  NEGATIVE . No headaches, no dizziness,  no numbness  ENDOCRINE:  NEGATIVE for temperature intolerance, skin/hair changes  HEME/ALLERGY/IMMUNE:  NEGATIVE for bleeding problems  PSYCHIATRIC:  NEGATIVE. no anxiety, no depression.          O: He appears well,   Vitals: Temp 98.3  F (36.8  C)  Resp 18  Ht 1.753 m (5' 9\")  Wt 70.3 kg (155 lb)  BMI 22.89 kg/m2  BMI= Body mass index is 22.89 kg/(m^2).   Cervical spine has full range of motion with mild pain.  Full range of motion of shoulder, elbows, wrists and fingers.  Hand exam revealed     Right: reduced sensation along median nerve distribution, + Tinel's sign, no thenar atrophy, no weakness of thumb/pinky pincer grasp.  Left: reduced sensation along median nerve distribution, + Tinel's sign, no thenar atrophy, no weakness of thumb/pinky pincer grasp   strength: Right normal, Left normal.      A: Bilateral carpal tunnel syndrome.      P: Explanation of median nerve entrapment is given.  The treatment spectrum is discussed, including conservative treatment with night splint, NSAID, activity modification, and possible surgical release if the symptoms are persistent and severe.   Bilateral EMG ordered.  Return to clinic after EMG.      Again, thank you for allowing me to participate in the care of your patient.        Sincerely,        Rogelio Castillo MD    "

## 2017-09-19 NOTE — NURSING NOTE
"Chief Complaint   Patient presents with     Consult     Bilateral hand numbness for the last 4-5months. Pain level 7/10 achy and occasional. Shaking the hands makes the pain better.       Initial Temp 98.3  F (36.8  C)  Resp 18  Ht 1.753 m (5' 9\")  Wt 70.3 kg (155 lb)  BMI 22.89 kg/m2 Estimated body mass index is 22.89 kg/(m^2) as calculated from the following:    Height as of this encounter: 1.753 m (5' 9\").    Weight as of this encounter: 70.3 kg (155 lb).  Medication Reconciliation: complete   Columba Zimmerman MA      "

## 2017-09-19 NOTE — PROGRESS NOTES
Juan Antonio Sierra is a 81 year old male who is seen in consultation at the request of Dr. Gianni Wright  for complaint of numbness of lateral aspect of bilateral hand, especially with use of the hand and at night. Pain is achey and throbbing.  He has had symptoms for 6 months.  Small finger is not involved.  Prior treatment used: Wrist splint - no.  NSAID: - no, he is on warfarin.  He had bilateral carpal tunnel release in 1988 by Dr. Moshe Timmons.  He has also had numbness of right toes.  Had surgery for lumbar spinal stenosis in 2014.    Employment: retired.. This is not work related.      PAST MEDICAL HISTORY:  Diabetes mellitus negative, hypothyroid negative.    EMG has not been performed.      Past Medical History:   Diagnosis Date     AMI (acute myocardial infarction) (H)     X2     Arthritis      Atrial fibrillation (H)      CABG (coronary artery bypass graft) 1983, 1998    first a 5 v cabg, then a 3v cabg     CHF (congestive heart failure) (H)      Hyperlipidemia LDL goal <100      Hypertension goal BP (blood pressure) < 140/90      Moderate major depression (H)      Pacemaker 05/2011       Past Surgical History:   Procedure Laterality Date     CATHETER, ABLATION  July 2012    for atrial fibrillation      CORONARY ARTERY BYPASS  1983 (5v)  -  1998 (3v)    cabg @ [facility] and a redo     IMPLANT PACEMAKER  6/2012     Laminectomy - T11 to L5  aug 26th, 2015    [place]     STENT, CORONARY, TREE  6/3/2011    stenting of the LAD by the mammary artery graft in 2011     TONSILLECTOMY         Family History   Problem Relation Age of Onset     Respiratory Mother      chf     CEREBROVASCULAR DISEASE Mother 86     HEART DISEASE Father      Neurologic Disorder Sister      migraines/ unknown age       Social History     Social History     Marital status:      Spouse name: Maureen     Number of children: 3     Years of education: 15     Occupational History     Computer programer Retired     1999     Social History Main  Topics     Smoking status: Former Smoker     Types: Pipe, Cigars     Start date: 1/1/1947     Quit date: 8/18/2015     Smokeless tobacco: Never Used     Alcohol use Yes      Comment: 0-1 drinks of brigitte in evening     Drug use: No     Sexual activity: Not on file     Other Topics Concern     Not on file     Social History Narrative       Current Outpatient Prescriptions   Medication Sig Dispense Refill     levothyroxine (SYNTHROID/LEVOTHROID) 75 MCG tablet TAKE 1 TABLET BY MOUTH ONCE DAILY 90 tablet 2     furosemide (LASIX) 80 MG tablet Take 1 tablet (80 mg) by mouth daily 90 tablet 1     lisinopril (PRINIVIL/ZESTRIL) 2.5 MG tablet Take 1 tablet (2.5 mg) by mouth daily 90 tablet 1     carvedilol (COREG) 12.5 MG tablet Take 12.5 mg by mouth 2 times daily (with meals)       warfarin (COUMADIN) 5 MG tablet Take 1/2 tablet (2.5 mg ) daily or as directed by ACC nurse 90 tablet 1     Ferrous Sulfate (IRON) 325 (65 FE) MG tablet Take 1 tablet by mouth daily       MAGNESIUM OXIDE PO        Nitroglycerin (NITROSTAT SL) Place 0.4 mg under the tongue       potassium chloride SA (K-DUR,KLOR-CON M) 20 MEQ tablet Take 1 tablet by mouth daily       fish oil-omega-3 fatty acids (FISH OIL) 1000 MG capsule Take 2 g by mouth daily       Cholecalciferol (VITAMIN D3) 2000 UNITS CAPS 1 daily       Cyanocobalamin (B-12) 2500 MCG TABS 1 daily       Multiple vitamin CAPS 1 daily       amiodarone (PACERONE/CODARONE) 200 MG tablet Take 100 mg by mouth daily        aspirin 81 MG tablet Take 1 tablet by mouth daily.         Allergies   Allergen Reactions     Hay Fever & [A.R.M.]        REVIEW OF SYSTEMS:  CONSTITUTIONAL:  NEGATIVE for fever, chills, change in weight, not feeling tired  SKIN:  NEGATIVE for worrisome rashes, no skin lumps, no skin ulcers and no non-healing wounds  EYES:  NEGATIVE for vision changes or irritation.  ENT/MOUTH:  NEGATIVE.  No hearing loss, no hoarseness, no difficulty swallowing.  RESP:  NEGATIVE. No cough or  "shortness of breath.  BREAST:  NEGATIVE for masses, tenderness or discharge  CV:  NEGATIVE for chest pain, palpitations or peripheral edema  GI:  NEGATIVE for nausea, abdominal pain, heartburn, or change in bowel habits  :  Negative. No dysuria, no hematuria  MUSCULOSKELETAL:  See HPI above  NEURO:  NEGATIVE . No headaches, no dizziness,  no numbness  ENDOCRINE:  NEGATIVE for temperature intolerance, skin/hair changes  HEME/ALLERGY/IMMUNE:  NEGATIVE for bleeding problems  PSYCHIATRIC:  NEGATIVE. no anxiety, no depression.          O: He appears well,   Vitals: Temp 98.3  F (36.8  C)  Resp 18  Ht 1.753 m (5' 9\")  Wt 70.3 kg (155 lb)  BMI 22.89 kg/m2  BMI= Body mass index is 22.89 kg/(m^2).   Cervical spine has full range of motion with mild pain.  Full range of motion of shoulder, elbows, wrists and fingers.  Hand exam revealed     Right: reduced sensation along median nerve distribution, + Tinel's sign, no thenar atrophy, no weakness of thumb/pinky pincer grasp.  Left: reduced sensation along median nerve distribution, + Tinel's sign, no thenar atrophy, no weakness of thumb/pinky pincer grasp   strength: Right normal, Left normal.      A: Bilateral carpal tunnel syndrome.      P: Explanation of median nerve entrapment is given.  The treatment spectrum is discussed, including conservative treatment with night splint, NSAID, activity modification, and possible surgical release if the symptoms are persistent and severe.   Bilateral EMG ordered.  Return to clinic after EMG.    "

## 2017-09-19 NOTE — PATIENT INSTRUCTIONS
EMG of bilateral hands will be with Dr. Pierce for a 90 minute (one limb) appointment at [facility] 2nd [facility]. If you have question or need to reschedule your appointment, call [phone] and talk to Libra.    Call your insurance and check for coverage.    Do not wear Jewelry or metal.    Shower the day of the procedure.    Do not wear lotions or creams.    Bring warm gloves to wear.

## 2017-09-19 NOTE — MR AVS SNAPSHOT
After Visit Summary   9/19/2017    Juan Antonio Sierra    MRN: [id number]           Patient Information     Date Of Birth          1935        Visit Information        Provider Department      9/19/2017 9:30 AM Rogelio Castillo MD [facility]        Today's Diagnoses     Bilateral hand numbness    -  1      Care Instructions    EMG of bilateral hands will be with Dr. Pierce for a 90 minute (one limb) appointment at [facility] 2nd floor. If you have question or need to reschedule your appointment, call [phone] and talk to Libra.    Call your insurance and check for coverage.    Do not wear Jewelry or metal.    Shower the day of the procedure.    Do not wear lotions or creams.    Bring warm gloves to wear.             Follow-ups after your visit        Additional Services     NEUROLOGY ADULT REFERRAL       Your provider has referred you for the following:   EMG at [facility]: Neurology - Dr. Pierce - [facility] [phone]   [email or web address] - Bilateral EMG for bilateral hand numbness    Please be aware that coverage of these services is subject to the terms and limitations of your health insurance plan.  Call member services at your health plan with any benefit or coverage questions.      Please bring the following with you to your appointment:    (1) Any X-Rays, CTs or MRIs which have been performed.  Contact the facility where they were done to arrange for  prior to your scheduled appointment.    (2) List of current medications  (3) This referral request   (4) Any documents/labs given to you for this referral                  Who to contact     If you have questions or need follow up information about today's clinic visit or your schedule please contact [facility] directly at [phone].  Normal or non-critical lab and imaging results will be communicated to you by  "MyChart, letter or phone within 4 business days after the clinic has received the results. If you do not hear from us within 7 days, please contact the clinic through [employer] or phone. If you have a critical or abnormal lab result, we will notify you by phone as soon as possible.  Submit refill requests through [employer] or call your pharmacy and they will forward the refill request to us. Please allow 3 business days for your refill to be completed.          Additional Information About Your Visit        [employer] Information     [employer] gives you secure access to your electronic health record. If you see a primary care provider, you can also send messages to your care team and make appointments. If you have questions, please call your primary care clinic.  If you do not have a primary care provider, please call [phone] and they will assist you.        Care EveryWhere ID     This is your Care EveryWhere ID. This could be used by other organizations to access your [facility] medical records  [id number]        Your Vitals Were     Temperature Respirations Height BMI (Body Mass Index)          98.3  F (36.8  C) 18 1.753 m (5' 9\") 22.89 kg/m2         Blood Pressure from Last 3 Encounters:   07/20/17 112/80   12/22/16 (!) 82/48   12/05/16 (!) 86/62    Weight from Last 3 Encounters:   09/19/17 70.3 kg (155 lb)   07/20/17 75.4 kg (166 lb 3.2 oz)   12/22/16 70.9 kg (156 lb 6.4 oz)              We Performed the Following     NEUROLOGY ADULT REFERRAL        Primary Care Provider Office Phone # Fax #    Gianni Wright [phone] [address] [address]        Equal Access to Services     [facility] CARLIN AH: Hadii terrell Herman, wareddda luolegarioadaha, qaybta kaalmada edgard cantu. So [facility] [phone].    ATENCIÓN: Si habla español, tiene a ness disposición servicios gratuitos de asistencia lingüística. Llame al [phone].    We comply with " applicable federal civil rights laws and [place] laws. We do not discriminate on the basis of race, color, national origin, age, disability sex, sexual orientation or gender identity.            Thank you!     Thank you for choosing [facility]  for your care. Our goal is always to provide you with excellent care. Hearing back from our patients is one way we can continue to improve our services. Please take a few minutes to complete the written survey that you may receive in the mail after your visit with us. Thank you!             Your Updated Medication List - Protect others around you: Learn how to safely use, store and throw away your medicines at www.disposemymeds.org.          This list is accurate as of: 9/19/17  9:53 AM.  Always use your most recent med list.                   Brand Name Dispense Instructions for use Diagnosis    amiodarone 200 MG tablet    PACERONE/CODARONE     Take 100 mg by mouth daily        aspirin 81 MG tablet      Take 1 tablet by mouth daily.        B-12 2500 MCG Tabs      1 daily        carvedilol 12.5 MG tablet    COREG     Take 12.5 mg by mouth 2 times daily (with meals)        fish oil-omega-3 fatty acids 1000 MG capsule      Take 2 g by mouth daily        furosemide 80 MG tablet    LASIX    90 tablet    Take 1 tablet (80 mg) by mouth daily    Congestive heart failure, unspecified congestive heart failure chronicity, unspecified congestive heart failure type (H), Hypertension goal BP (blood pressure) < 140/90       iron 325 (65 FE) MG tablet      Take 1 tablet by mouth daily        levothyroxine 75 MCG tablet    SYNTHROID/LEVOTHROID    90 tablet    TAKE 1 TABLET BY MOUTH ONCE DAILY    Hypothyroidism, unspecified type       lisinopril 2.5 MG tablet    PRINIVIL/Zestril    90 tablet    Take 1 tablet (2.5 mg) by mouth daily    Acute ST elevation myocardial infarction (STEMI), unspecified artery (H)       MAGNESIUM OXIDE PO           Multiple vitamin Caps      1  daily        NITROSTAT SL      Place 0.4 mg under the tongue        potassium chloride SA 20 MEQ CR tablet    K-DUR/KLOR-CON M     Take 1 tablet by mouth daily        vitamin D3 2000 UNITS Caps      1 daily        warfarin 5 MG tablet    COUMADIN    90 tablet    Take 1/2 tablet (2.5 mg ) daily or as directed by [facility] nurse    Atrial fibrillation (H)

## 2017-09-29 ENCOUNTER — OFFICE VISIT (OUTPATIENT)
Dept: NEUROLOGY | Facility: CLINIC | Age: 82
End: 2017-09-29
Payer: COMMERCIAL

## 2017-09-29 DIAGNOSIS — G56.00 MEDIAN NERVE ENTRAPMENT: Primary | ICD-10-CM

## 2017-09-29 DIAGNOSIS — E03.9 ACQUIRED HYPOTHYROIDISM: ICD-10-CM

## 2017-09-29 DIAGNOSIS — Z95.0 CARDIAC PACEMAKER IN SITU: ICD-10-CM

## 2017-09-29 DIAGNOSIS — Z79.01 ON WARFARIN THERAPY: ICD-10-CM

## 2017-09-29 PROCEDURE — 95913 NRV CNDJ TEST 13/> STUDIES: CPT | Performed by: PSYCHIATRY & NEUROLOGY

## 2017-09-29 NOTE — MR AVS SNAPSHOT
After Visit Summary   9/29/2017    Juan Antonio Sierra    MRN: [id number]           Patient Information     Date Of Birth          1935        Visit Information        Provider Department      9/29/2017 11:00 AM Magi Pierce MD [facility]        Today's Diagnoses     Median nerve entrapment-bilateral, at wrist    -  1    Acquired hypothyroidism        On warfarin therapy        Cardiac pacemaker in situ-wuith defibrilator           Follow-ups after your visit        Follow-up notes from your care team     Return for EMG.      Who to contact     If you have questions or need follow up information about today's clinic visit or your schedule please contact [facility] directly at [phone].  Normal or non-critical lab and imaging results will be communicated to you by MyChart, letter or phone within 4 business days after the clinic has received the results. If you do not hear from us within 7 days, please contact the clinic through [employer] or phone. If you have a critical or abnormal lab result, we will notify you by phone as soon as possible.  Submit refill requests through [employer] or call your pharmacy and they will forward the refill request to us. Please allow 3 business days for your refill to be completed.          Additional Information About Your Visit        MyChart Information     [employer] gives you secure access to your electronic health record. If you see a primary care provider, you can also send messages to your care team and make appointments. If you have questions, please call your primary care clinic.  If you do not have a primary care provider, please call [phone] and they will assist you.        Care EveryWhere ID     This is your Care EveryWhere ID. This could be used by other organizations to access your [facility] medical records  [id number]         Blood Pressure from Last 3 Encounters:   07/20/17 112/80   12/22/16 (!) 82/48    12/05/16 (!) 86/62    Weight from Last 3 Encounters:   09/19/17 70.3 kg (155 lb)   07/20/17 75.4 kg (166 lb 3.2 oz)   12/22/16 70.9 kg (156 lb 6.4 oz)              We Performed the Following     HC NCS MOTOR W OR W/O F-WAVE, 13 OR MORE        Primary Care Provider Office Phone # Fax #    Gianni Wright [phone]       [address]  [place] [address]        Equal Access to Services     [facility]: Hadii aad ku hadasho Soomaali, waaxda luqadaha, qaybta kaalmada adeegyada, edgard ward . So [facility] [phone].    ATENCIÓN: Si habla español, tiene a ness disposición servicios gratuitos de asistencia lingüística. Ll[facility]ita [facility] [phone].    We comply with applicable federal civil rights laws and [place] laws. We do not discriminate on the basis of race, color, national origin, age, disability, sex, sexual orientation, or gender identity.            Thank you!     Thank you for choosing [facility]  for your care. Our goal is always to provide you with excellent care. Hearing back from our patients is one way we can continue to improve our services. Please take a few minutes to complete the written survey that you may receive in the mail after your visit with us. Thank you!             Your Updated Medication List - Protect others around you: Learn how to safely use, store and throw away your medicines at www.disposemymeds.org.          This list is accurate as of: 9/29/17 11:59 PM.  Always use your most recent med list.                   Brand Name Dispense Instructions for use Diagnosis    amiodarone 200 MG tablet    PACERONE/CODARONE     Take 100 mg by mouth daily        aspirin 81 MG tablet      Take 1 tablet by mouth daily.        B-12 2500 MCG Tabs      1 daily        carvedilol 12.5 MG tablet    COREG     Take 12.5 mg by mouth 2 times daily (with meals)        fish oil-omega-3 fatty acids 1000 MG capsule      Take 2 g by mouth daily         furosemide 80 MG tablet    LASIX    90 tablet    Take 1 tablet (80 mg) by mouth daily    Congestive heart failure, unspecified congestive heart failure chronicity, unspecified congestive heart failure type (H), Hypertension goal BP (blood pressure) < 140/90       iron 325 (65 FE) MG tablet      Take 1 tablet by mouth daily        levothyroxine 75 MCG tablet    SYNTHROID/LEVOTHROID    90 tablet    TAKE 1 TABLET BY MOUTH ONCE DAILY    Hypothyroidism, unspecified type       lisinopril 2.5 MG tablet    PRINIVIL/Zestril    90 tablet    Take 1 tablet (2.5 mg) by mouth daily    Acute ST elevation myocardial infarction (STEMI), unspecified artery (H)       MAGNESIUM OXIDE PO           Multiple vitamin Caps      1 daily        NITROSTAT SL      Place 0.4 mg under the tongue        potassium chloride SA 20 MEQ CR tablet    K-DUR/KLOR-CON M     Take 1 tablet by mouth daily        vitamin D3 2000 UNITS Caps      1 daily        warfarin 5 MG tablet    COUMADIN    90 tablet    Take 1/2 tablet (2.5 mg ) daily or as directed by [facility] nurse    Atrial fibrillation (H)

## 2017-09-29 NOTE — NURSING NOTE
Cardiac Pacemaker: Yes  Corticosteroids  (Prednisone):  None  Coumadin:  Yes  Defibrillator: Yes  Previous EMG study: Yes  Surgeries: Cervical spine: None Lumbar spine: Yes Hip surgery: No  Scars other than from surgeries indicated above:

## 2017-10-02 NOTE — PROGRESS NOTES
ELECTRODIAGNOSTIC LABORATORY REPORT    DATE OF VISIT: 2017  LOCATION: [facility]  MRN: [id number]  NAME: Mr. Juan Antonio Sierra  :  1935 (81 year old)  PRIMARY PROVIDER: Gianni Wright MD  REFERRED BY: Dr. Rogelio Castillo    REASON FOR TEST: Hand paresthesias    CLINICAL DATA: 81-year-old Right-handed man status post bilateral carpal tunnel decompression in  with previous history of hypothyroidism, s/p cardiac pacemaker and defibrillator in situ presents with bilateral hand paresthesias, left more than the right for more than 4 months. Paresthesias predominantly affecting digits 1, 2, 3 and 4.  Denies any history of nocturnal acroparesthesias.  Shaking the hand does not relieve his paresthesias. Denies any neck pain or cervical spine surgery. No previous history of diabetes mellitus.  Limited neuromuscular examination shows age-related wasting of the small muscles of both hands along with positive Tinel's sign at left wrist.    PERTINENT FINDINGS:   Sensory & Mixed Nerve Studies:  1. Left  Median (Digits 2 & 3) sensory distal peak latencies at upper range of normal, low normal SNAP amplitudes; reduced conduction velocities  2. Right Median (Digits 2 & 3) sensory peak distal latencies prolonged, normal (digit 2) or low normal (digit 3) SNAP amplitudes; reduced conduction velocities  3. Right & Left Median versus Ulnar palm-to-wrist mixed nerve palmar studies abnormal  4. Right & Left Ulnar nerve (Digit 5) sensory studies normal  5. Left Radial sensory response normal    Motor Nerves:  1. Left Median motor distal latency prolonged with reduced CMAP amplitude at wrist  2. Right Median distal motor distal latency prolonged with normal CMAP amplitude at wrist  3. Left Ulnar motor (ADM muscle) study normal  4. Right ulnar motor (ADM muscle CMAP amplitude minimally reduced  5. Right ulnar motor (FDI muscle) motor study normal    NEEDLE  Electromyography of upper limb muscles not done because of him being on long term coumadin.    IMPRESSION: Abnormal nerve conduction study.   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 He reports Left hand affected more than the Right hand. Suggest clinical correlation in view of previous h/o bilateral carpal tunnel surgery.    COMMENTS:  His previous EMGs in 1980s could not be located in [facility]. He does not recall location of the clinic at that time. It would be helpful to review old EMG if possible even if there are limitations in trying specific conclusions because of the differences in the machine and the technicalities of the studies.   He plans to have follow-up wit Dr. Castillo to review the test results & further plan of management    Thanks to Dr. Castillo and Gianni Wright MD for allowing me to participate in Mr. Sierra's care.  Please feel free to call me with any questions or concerns.     Magi Pierce MD, MRCPI  Neurologist    Cc: Dr. Anna Wright MD    Key:   SNAP: Sensory Nerve Action Potential  CMAP: Compound Muscle Action Potential

## 2017-10-24 ENCOUNTER — OFFICE VISIT (OUTPATIENT)
Dept: ORTHOPEDICS | Facility: CLINIC | Age: 82
End: 2017-10-24
Payer: COMMERCIAL

## 2017-10-24 VITALS — HEIGHT: 69 IN | WEIGHT: 164 LBS | RESPIRATION RATE: 16 BRPM | BODY MASS INDEX: 24.29 KG/M2

## 2017-10-24 DIAGNOSIS — G56.03 BILATERAL CARPAL TUNNEL SYNDROME: Primary | ICD-10-CM

## 2017-10-24 PROCEDURE — 99212 OFFICE O/P EST SF 10 MIN: CPT | Performed by: ORTHOPAEDIC SURGERY

## 2017-10-24 NOTE — NURSING NOTE
"Chief Complaint   Patient presents with     RECHECK     Follow-up for bilateral hand EMG 9/29/17.       Initial Resp 16  Ht 1.753 m (5' 9\")  Wt 74.4 kg (164 lb)  BMI 24.22 kg/m2 Estimated body mass index is 24.22 kg/(m^2) as calculated from the following:    Height as of this encounter: 1.753 m (5' 9\").    Weight as of this encounter: 74.4 kg (164 lb).  Medication Reconciliation: complete     Sergey Noel PA-C  Supervising physician: Rogelio Castillo MD  Dept. of Orthopedics  [facility]          "

## 2017-10-24 NOTE — LETTER
10/24/2017         RE: Juan Antonio Sierra  [address] [place]  [place] [address]        Dear Colleague,    Thank you for referring your patient, Juan Antonio Sierra, to the [facility]. Please see a copy of my visit note below.    Follow up bilateral EMG from 9/29/17.  This shows evidence of bilateral carpal tunnel syndrome, right > left  Symptoms are carpal tunnel syndrome left > right.  Also has small finger involvement of symptoms, but negative EMG or ulnar nerve.  Advised to keep elbow straight, don't lean on elbows.    He wants to proceed with left carpal tunnel release, possible synovectomy  Risks, benefits, potential complications and alternatives were discussed.  We cannot be sure any recovery will occur with surgery.    Again, thank you for allowing me to participate in the care of your patient.        Sincerely,        Rogelio Castillo MD

## 2017-10-24 NOTE — MR AVS SNAPSHOT
After Visit Summary   10/24/2017    Juan Antonio Sierra    MRN: [id number]           Patient Information     Date Of Birth          1935        Visit Information        Provider Department      10/24/2017 9:45 AM Rogelio Castillo MD [facility]        Care Instructions    Surgery:  Left carpal tunnel release revision, possible synovectomy    Preop physical with primary physician is needed within 30 days of the surgery.  Nothing to eat or drink for 8 hours before surgery.  For same day surgery you need a ride.  Someone should stay with you for 12 hours afterward.  Stop blood thinners 5 days before surgery (aspirin, warfarin, anti-inflammatories).      Call [phone] to schedule your surgery.          Follow-ups after your visit        Who to contact     If you have questions or need follow up information about today's clinic visit or your schedule please contact [facility] directly at [phone].  Normal or non-critical lab and imaging results will be communicated to you by Bitmenuhart, letter or phone within 4 business days after the clinic has received the results. If you do not hear from us within 7 days, please contact the clinic through [employer] or phone. If you have a critical or abnormal lab result, we will notify you by phone as soon as possible.  Submit refill requests through JobOn or call your pharmacy and they will forward the refill request to us. Please allow 3 business days for your refill to be completed.          Additional Information About Your Visit        [email or web address] Information     JobOn gives you secure access to your electronic health record. If you see a primary care provider, you can also send messages to your care team and make appointments. If you have questions, please call your primary care clinic.  If you do not have a primary care provider, please call [phone] and they will assist you.        Care EveryWhere ID     This  "is your Care EveryWhere ID. This could be used by other organizations to access your [facility] medical records  [id number]        Your Vitals Were     Respirations Height BMI (Body Mass Index)             16 1.753 m (5' 9\") 24.22 kg/m2          Blood Pressure from Last 3 Encounters:   07/20/17 112/80   12/22/16 (!) 82/48   12/05/16 (!) 86/62    Weight from Last 3 Encounters:   10/24/17 74.4 kg (164 lb)   09/19/17 70.3 kg (155 lb)   07/20/17 75.4 kg (166 lb 3.2 oz)              Today, you had the following     No orders found for display       Primary Care Provider Office Phone # Fax #    Gianni Wright [phone]       [address]  [facility] [address]        Equal Access to Services     [facility]: Hadii aad ku hadasho Soomaali, waaxda luqadaha, qaybta kaalmada adeegyada, waxay beatricein hayaan josie ward . So [facility] [phone].    ATENCIÓN: Si habla español, tiene a ness disposición servicios gratuitos de asistencia lingüística. Llame al [phone].    We comply with applicable federal civil rights laws and [place] laws. We do not discriminate on the basis of race, color, national origin, age, disability, sex, sexual orientation, or gender identity.            Thank you!     Thank you for choosing [facility]  for your care. Our goal is always to provide you with excellent care. Hearing back from our patients is one way we can continue to improve our services. Please take a few minutes to complete the written survey that you may receive in the mail after your visit with us. Thank you!             Your Updated Medication List - Protect others around you: Learn how to safely use, store and throw away your medicines at www.disposemymeds.org.          This list is accurate as of: 10/24/17 10:25 AM.  Always use your most recent med list.                   Brand Name Dispense Instructions for use Diagnosis    amiodarone 200 MG tablet    PACERONE/CODARONE     Take " 100 mg by mouth daily        aspirin 81 MG tablet      Take 1 tablet by mouth daily.        B-12 2500 MCG Tabs      1 daily        carvedilol 12.5 MG tablet    COREG     Take 12.5 mg by mouth 2 times daily (with meals)        fish oil-omega-3 fatty acids 1000 MG capsule      Take 2 g by mouth daily        furosemide 80 MG tablet    LASIX    90 tablet    Take 1 tablet (80 mg) by mouth daily    Congestive heart failure, unspecified congestive heart failure chronicity, unspecified congestive heart failure type (H), Hypertension goal BP (blood pressure) < 140/90       iron 325 (65 FE) MG tablet      Take 1 tablet by mouth daily        levothyroxine 75 MCG tablet    SYNTHROID/LEVOTHROID    90 tablet    TAKE 1 TABLET BY MOUTH ONCE DAILY    Hypothyroidism, unspecified type       lisinopril 2.5 MG tablet    PRINIVIL/Zestril    90 tablet    Take 1 tablet (2.5 mg) by mouth daily    Acute ST elevation myocardial infarction (STEMI), unspecified artery (H)       MAGNESIUM OXIDE PO           Multiple vitamin Caps      1 daily        NITROSTAT SL      Place 0.4 mg under the tongue        potassium chloride SA 20 MEQ CR tablet    K-DUR/KLOR-CON M     Take 1 tablet by mouth daily        vitamin D3 2000 UNITS Caps      1 daily        warfarin 5 MG tablet    COUMADIN    90 tablet    Take 1/2 tablet (2.5 mg ) daily or as directed by [facility] nurse    Atrial fibrillation (H)

## 2017-10-24 NOTE — PROGRESS NOTES
Follow up bilateral EMG from 9/29/17.  This shows evidence of bilateral carpal tunnel syndrome, right > left  Symptoms are carpal tunnel syndrome left > right.  Also has small finger involvement of symptoms, but negative EMG or ulnar nerve.  Advised to keep elbow straight, don't lean on elbows.    He wants to proceed with left carpal tunnel release, possible synovectomy  Risks, benefits, potential complications and alternatives were discussed.  We cannot be sure any recovery will occur with surgery.

## 2017-10-24 NOTE — PATIENT INSTRUCTIONS
Surgery:  Left carpal tunnel release revision, possible synovectomy    Preop physical with primary physician is needed within 30 days of the surgery.  Nothing to eat or drink for 8 hours before surgery.  For same day surgery you need a ride.  Someone should stay with you for 12 hours afterward.  Stop blood thinners 5 days before surgery (aspirin, warfarin, anti-inflammatories).      Call [phone] to schedule your surgery.

## 2017-10-25 DIAGNOSIS — I50.9 CHRONIC CONGESTIVE HEART FAILURE, UNSPECIFIED CONGESTIVE HEART FAILURE TYPE: Primary | ICD-10-CM

## 2017-10-25 RX ORDER — CARVEDILOL 12.5 MG/1
12.5 TABLET ORAL 2 TIMES DAILY WITH MEALS
Qty: 60 TABLET | Refills: 2 | Status: SHIPPED | OUTPATIENT
Start: 2017-10-25 | End: 2018-07-24

## 2017-10-25 NOTE — TELEPHONE ENCOUNTER
Spoke to patient in regards to approved refill. Patient states understanding.    Rafia Leach, KETTY

## 2017-10-25 NOTE — TELEPHONE ENCOUNTER
Reason for Call:  Other prescription    Detailed comments: Patient is requesting new Rx for Carvedilol. Please call.    Phone Number Patient can be reached at: Home number on file [phone] (home)    Best Time: any    Can we leave a detailed message on this number? YES    Call taken on 10/25/2017 at 11:28 AM by Debbie Roa

## 2017-10-25 NOTE — TELEPHONE ENCOUNTER
Routing refill request to provider for review/approval because:  Medication is reported/historical      Ruth Nichols RN - BC

## 2017-11-16 ENCOUNTER — TELEPHONE (OUTPATIENT)
Dept: ORTHOPEDICS | Facility: CLINIC | Age: 82
End: 2017-11-16

## 2017-11-16 NOTE — TELEPHONE ENCOUNTER
Reason for Call:  Other returning call    Detailed comments: patient returning the call, please contact the patient to discuss further,     Phone Number Patient can be reached at: Home number on file [phone] (home)    Best Time: today    Can we leave a detailed message on this number? YES    Call taken on 11/16/2017 at 1:45 PM by Marysol Pickett

## 2017-11-16 NOTE — TELEPHONE ENCOUNTER
Spoke with Juan Antonio regarding his concern. I told him that the numbness in his hands is likely from the carpal tunnel as he did have this diagnosis confirmed with his EMG from September 2017, and this is why Dr. Castillo recommended his procedure. As for the numbness elsewhere on his body, I told him that this is something that he would need to talk to his primary physician about. He appreciated the phone call and had no further questions.    MERY Ambrosio-C  Supervising physician: Rogelio Castillo MD  Dept. of Orthopedics  [facility]

## 2017-11-16 NOTE — TELEPHONE ENCOUNTER
Reason for Call:  Other call back    Detailed comments: Patient is scheduled for surgery on 12/06/2017 for left carpal tunnel, patient is noting that symptoms are getting worse and involves hands and feet. Please call and advise Thank you    Phone Number Patient can be reached at: Home number on file [phone] (home)    Best Time: any    Can we leave a detailed message on this number? YES    Call taken on 11/16/2017 at 10:21 AM by Marely Polanco

## 2017-11-28 ENCOUNTER — OFFICE VISIT (OUTPATIENT)
Dept: INTERNAL MEDICINE | Facility: CLINIC | Age: 82
End: 2017-11-28
Payer: COMMERCIAL

## 2017-11-28 VITALS
BODY MASS INDEX: 24.37 KG/M2 | WEIGHT: 165 LBS | TEMPERATURE: 97.4 F | DIASTOLIC BLOOD PRESSURE: 58 MMHG | HEART RATE: 81 BPM | OXYGEN SATURATION: 95 % | SYSTOLIC BLOOD PRESSURE: 100 MMHG

## 2017-11-28 DIAGNOSIS — I48.20 CHRONIC ATRIAL FIBRILLATION (H): ICD-10-CM

## 2017-11-28 DIAGNOSIS — R53.1 GENERALIZED WEAKNESS: ICD-10-CM

## 2017-11-28 DIAGNOSIS — Z23 NEED FOR PROPHYLACTIC VACCINATION WITH TETANUS-DIPHTHERIA (TD): ICD-10-CM

## 2017-11-28 DIAGNOSIS — Z01.818 PREOP GENERAL PHYSICAL EXAM: Primary | ICD-10-CM

## 2017-11-28 DIAGNOSIS — I25.5 ISCHEMIC CARDIOMYOPATHY: ICD-10-CM

## 2017-11-28 DIAGNOSIS — J43.8 OTHER EMPHYSEMA (H): ICD-10-CM

## 2017-11-28 DIAGNOSIS — I50.9 CONGESTIVE HEART FAILURE, UNSPECIFIED CONGESTIVE HEART FAILURE CHRONICITY, UNSPECIFIED CONGESTIVE HEART FAILURE TYPE: ICD-10-CM

## 2017-11-28 DIAGNOSIS — Z23 NEED FOR PROPHYLACTIC VACCINATION AND INOCULATION AGAINST INFLUENZA: ICD-10-CM

## 2017-11-28 DIAGNOSIS — N18.30 CKD (CHRONIC KIDNEY DISEASE) STAGE 3, GFR 30-59 ML/MIN (H): ICD-10-CM

## 2017-11-28 LAB
ALBUMIN SERPL-MCNC: 3.6 G/DL (ref 3.4–5)
ALP SERPL-CCNC: 76 U/L (ref 40–150)
ALT SERPL W P-5'-P-CCNC: 21 U/L (ref 0–70)
ANION GAP SERPL CALCULATED.3IONS-SCNC: 11 MMOL/L (ref 3–14)
AST SERPL W P-5'-P-CCNC: 26 U/L (ref 0–45)
BASOPHILS # BLD AUTO: 0 10E9/L (ref 0–0.2)
BASOPHILS NFR BLD AUTO: 0.4 %
BILIRUB SERPL-MCNC: 0.8 MG/DL (ref 0.2–1.3)
BUN SERPL-MCNC: 34 MG/DL (ref 7–30)
CALCIUM SERPL-MCNC: 9.2 MG/DL (ref 8.5–10.1)
CHLORIDE SERPL-SCNC: 102 MMOL/L (ref 94–109)
CO2 SERPL-SCNC: 27 MMOL/L (ref 20–32)
CREAT SERPL-MCNC: 1.21 MG/DL (ref 0.66–1.25)
DIFFERENTIAL METHOD BLD: ABNORMAL
DIGOXIN SERPL-MCNC: 0.1 UG/L (ref 0.5–2)
EOSINOPHIL # BLD AUTO: 0.2 10E9/L (ref 0–0.7)
EOSINOPHIL NFR BLD AUTO: 2.3 %
ERYTHROCYTE [DISTWIDTH] IN BLOOD BY AUTOMATED COUNT: 13.3 % (ref 10–15)
GFR SERPL CREATININE-BSD FRML MDRD: 57 ML/MIN/1.7M2
GLUCOSE SERPL-MCNC: 96 MG/DL (ref 70–99)
HCT VFR BLD AUTO: 38.7 % (ref 40–53)
HGB BLD-MCNC: 13 G/DL (ref 13.3–17.7)
LYMPHOCYTES # BLD AUTO: 1.3 10E9/L (ref 0.8–5.3)
LYMPHOCYTES NFR BLD AUTO: 17 %
MCH RBC QN AUTO: 34.1 PG (ref 26.5–33)
MCHC RBC AUTO-ENTMCNC: 33.6 G/DL (ref 31.5–36.5)
MCV RBC AUTO: 102 FL (ref 78–100)
MONOCYTES # BLD AUTO: 0.9 10E9/L (ref 0–1.3)
MONOCYTES NFR BLD AUTO: 11.9 %
NEUTROPHILS # BLD AUTO: 5.3 10E9/L (ref 1.6–8.3)
NEUTROPHILS NFR BLD AUTO: 68.4 %
NT-PROBNP SERPL-MCNC: 2409 PG/ML (ref 0–450)
PLATELET # BLD AUTO: 173 10E9/L (ref 150–450)
POTASSIUM SERPL-SCNC: 4.5 MMOL/L (ref 3.4–5.3)
PROT SERPL-MCNC: 8.1 G/DL (ref 6.8–8.8)
RBC # BLD AUTO: 3.81 10E12/L (ref 4.4–5.9)
SODIUM SERPL-SCNC: 140 MMOL/L (ref 133–144)
TSH SERPL DL<=0.005 MIU/L-ACNC: 0.81 MU/L (ref 0.4–4)
WBC # BLD AUTO: 7.8 10E9/L (ref 4–11)

## 2017-11-28 PROCEDURE — 93000 ELECTROCARDIOGRAM COMPLETE: CPT | Performed by: INTERNAL MEDICINE

## 2017-11-28 PROCEDURE — 99215 OFFICE O/P EST HI 40 MIN: CPT | Performed by: INTERNAL MEDICINE

## 2017-11-28 PROCEDURE — 80162 ASSAY OF DIGOXIN TOTAL: CPT | Performed by: INTERNAL MEDICINE

## 2017-11-28 PROCEDURE — 80050 GENERAL HEALTH PANEL: CPT | Performed by: INTERNAL MEDICINE

## 2017-11-28 PROCEDURE — 36415 COLL VENOUS BLD VENIPUNCTURE: CPT | Performed by: INTERNAL MEDICINE

## 2017-11-28 PROCEDURE — 83880 ASSAY OF NATRIURETIC PEPTIDE: CPT | Performed by: INTERNAL MEDICINE

## 2017-11-28 NOTE — Clinical Note
"Need to chat with Dr. Garcia with [facility] or just have him read my preoperative history and physical examination conclusion. Patient needs either a formal cardiac clearance for his surgery or possibly Dr. Garcia can \" give the ok \". We await workup / test results "

## 2017-11-28 NOTE — PROGRESS NOTES
"[facility]  [address] [address]  [phone]  Dept: [phone]    PRE-OP EVALUATION:  Today's date: 2017    Juan Antonio Sierra (: 1935) presents for pre-operative evaluation assessment as requested by  ***.  He requires evaluation and anesthesia risk assessment prior to undergoing surgery/procedure for treatment of *** .  Proposed procedure: ***    Date of Surgery/ Procedure: ***  Time of Surgery/ Procedure: ***  Hospital/Surgical Facility: ***  {SURGERY FAX NUMBER:[id number]::\"Fax number for surgical facility: ***\"}  Primary Physician: Gianni Wright  Type of Anesthesia Anticipated: {ANESTHESIA:525964}    Patient has a Health Care Directive or Living Will:  {YES/NO:484208::\"NO\"}    {PREOP QUESTIONNAIRE OPTIONS 2 (by MA):339276}    HPI:                                                      Brief HPI related to upcoming procedure: ***      {Ch. Problems:672738}    MEDICAL HISTORY:                                                      Patient Active Problem List    Diagnosis Date Noted     Bilateral carpal tunnel syndrome 10/24/2017     Priority: Medium     Acquired hypothyroidism 2017     Priority: Medium     IPF (idiopathic pulmonary fibrosis) (H) 2016     Priority: Medium     CKD (chronic kidney disease) stage 3, GFR 30-59 ml/min 2016     Priority: Medium     Other emphysema (H) 2016     Priority: Medium     Ischemic cardiomyopathy 2016     Priority: Medium     Congestive heart failure, unspecified congestive heart failure chronicity, unspecified congestive heart failure type (H) 2016     Priority: Medium     Major depressive disorder, recurrent episode, moderate (H) 2016     Priority: Medium     High risk medications (not anticoagulants) long-term use 2013     Priority: Medium     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 Pt has an increased need for coumadin   Diagnosis updated by automated process. Provider to review and confirm.       Health Care Home 07/31/2012     Priority: Medium     Philip Mario RN,C--[phone]   A / [facility]        DX V65.8 REPLACED WITH [facility]ALTH CARE HOME (04/08/2013)       Atrial fibrillation (H)      Priority: Medium     AMI (acute myocardial infarction)      Priority: Medium     X2       Arthritis      Priority: Medium     Hyperlipidemia LDL goal <100      Priority: Medium     Hypertension goal BP (blood pressure) < 140/90      Priority: Medium     S/P CABG (coronary artery bypass graft)      Priority: Medium     1983, 1998, first a 5 v cabg, then a 3v cabg        Past Medical History:   Diagnosis Date     AMI (acute myocardial infarction)     X2     Arthritis      Atrial fibrillation (H)      CABG (coronary artery bypass graft) 1983, 1998    first a 5 v cabg, then a 3v cabg     CHF (congestive heart failure) (H)      Hyperlipidemia LDL goal <100      Hypertension goal BP (blood pressure) < 140/90      Moderate major depression (H)      Pacemaker 05/2011     Past Surgical History:   Procedure Laterality Date     CATHETER, ABLATION  July 2012    for atrial fibrillation      CORONARY ARTERY BYPASS  1983 (5v)  -  1998 (3v)    cabg @ [facility] and a redo     IMPLANT PACEMAKER  6/2012     Laminectomy - T11 to L5  aug 26th, 2015    [facility]     STENT, CORONARY, TREE  6/3/2011    stenting of the LAD by the mammary artery graft in 2011     TONSILLECTOMY       Current Outpatient Prescriptions   Medication Sig Dispense Refill     carvedilol (COREG) 12.5 MG tablet Take 1 tablet (12.5 mg) by mouth 2 times daily (with meals) 60 tablet 2     levothyroxine (SYNTHROID/LEVOTHROID) 75 MCG tablet TAKE 1  "TABLET BY MOUTH ONCE DAILY 90 tablet 2     furosemide (LASIX) 80 MG tablet Take 1 tablet (80 mg) by mouth daily 90 tablet 1     lisinopril (PRINIVIL/ZESTRIL) 2.5 MG tablet Take 1 tablet (2.5 mg) by mouth daily 90 tablet 1     warfarin (COUMADIN) 5 MG tablet Take 1/2 tablet (2.5 mg ) daily or as directed by ACC nurse 90 tablet 1     Ferrous Sulfate (IRON) 325 (65 FE) MG tablet Take 1 tablet by mouth daily       MAGNESIUM OXIDE PO        Nitroglycerin (NITROSTAT SL) Place 0.4 mg under the tongue       potassium chloride SA (K-DUR,KLOR-CON M) 20 MEQ tablet Take 1 tablet by mouth daily       fish oil-omega-3 fatty acids (FISH OIL) 1000 MG capsule Take 2 g by mouth daily       Cholecalciferol (VITAMIN D3) 2000 UNITS CAPS 1 daily       Cyanocobalamin (B-12) 2500 MCG TABS 1 daily       Multiple vitamin CAPS 1 daily       amiodarone (PACERONE/CODARONE) 200 MG tablet Take 100 mg by mouth daily        aspirin 81 MG tablet Take 1 tablet by mouth daily.       OTC products: {OTC ANALGESICS:712343}    Allergies   Allergen Reactions     Hay Fever & [A.R.M.]       Latex Allergy: {YES/NO WITH DEFAULT:165039::\"NO\"}    Social History   Substance Use Topics     Smoking status: Former Smoker     Types: Pipe, Cigars     Start date: 1/1/1947     Quit date: 8/18/2015     Smokeless tobacco: Never Used     Alcohol use Yes      Comment: 0-1 drinks of brigitte in evening     History   Drug Use No       REVIEW OF SYSTEMS:                                                    {ROS Preop Choices:118505}    EXAM:                                                    /58 (BP Location: Left arm, Patient Position: Sitting)  Pulse 81  Temp 97.4  F (36.3  C) (Oral)  Wt 74.8 kg (165 lb)  SpO2 95%  BMI 24.37 kg/m2  {EXAM Preop Choices:038604}    DIAGNOSTICS:                                                    {DIAGNOSTIC FOR PREOP:054066}    Recent Labs   Lab Test  11/28/17   1320  07/20/17   1134  12/22/16   1329  06/02/16 04/28/16 08/09/12   HGB  " "13.0*  12.6*  13.6   --    --    --    < >   --    PLT  173  152   --    --    --    --    < >   --    INR   --    --    --    --   2.1*  2.7*   < >   --    NA   --   138  136   < >   --    --    < >  138   POTASSIUM   --   4.0  4.1   < >   --    --    < >  4.4   CR   --   1.08  1.62*   < >   --    --    < >  0.9   A1C   --    --    --    --    --    --    --   5.8    < > = values in this interval not displayed.        IMPRESSION:                                                    {PREOP REASONS:745688::\"Reason for surgery/procedure: ***\",\"Diagnosis/reason for consult: ***\"}    The proposed surgical procedure is considered {HIGH=major cardiovascular or procedures requiring prolonged anesthesia >4 hours or large fluid shifts;    INTERMEDIATE=abdominal, most orthopedic and intrathoracic surgery; LOW= endoscopy, cataract and breast surgery:954140} risk.    REVISED CARDIAC RISK INDEX  The patient has the following serious cardiovascular risks for perioperative complications such as (MI, PE, VFib and 3  AV Block):  {PREOP REVISED CARDIAC INDEX (RCI):445552:p:\"No serious cardiac risks\"}  INTERPRETATION: {REVISED CARDIAC RISK INTERPRETATION:393999}    The patient has the following additional risks for perioperative complications:  {Additional perioperative risks:848592:p:\"No identified additional risks\"}      ICD-10-CM    1. Preop general physical exam Z01.818 Digoxin level     EKG 12-lead complete w/read - Clinics     TSH with free T4 reflex     CBC with platelets differential     Comprehensive metabolic panel     Echocardiogram Complete     EKG 12-lead complete w/read - Clinics     BNP-N terminal pro   2. Need for prophylactic vaccination and inoculation against influenza Z23    3. Need for prophylactic vaccination with tetanus-diphtheria (TD) Z23    4. Congestive heart failure, unspecified congestive heart failure chronicity, unspecified congestive heart failure type (H) I50.9    5. Generalized weakness R53.1    6. " "Chronic atrial fibrillation (H) I48.2 Digoxin level   7. Other emphysema (H) J43.8    8. Ischemic cardiomyopathy I25.5 Digoxin level     TSH with free T4 reflex     CBC with platelets differential     Comprehensive metabolic panel     Echocardiogram Complete     EKG 12-lead complete w/read - Clinics     BNP-N terminal pro   9. CKD (chronic kidney disease) stage 3, GFR 30-59 ml/min N18.3 Comprehensive metabolic panel       RECOMMENDATIONS:                                                      {IMPORTANT - Conditions - complete carefully!!:997053}    {IMPORTANT - Medications:105688::\"--Patient is to take all scheduled medications on the day of surgery EXCEPT for modifications listed below.\"}    {IMPORTANT - Approval:009407:p:\"APPROVAL GIVEN to proceed with proposed procedure, without further diagnostic evaluation\"}       Signed Electronically by: Gianni Wright MD    Copy of this evaluation report is provided to requesting physician.    [facility] Preop Guidelines  "

## 2017-11-28 NOTE — PATIENT INSTRUCTIONS
- Follow up for an echocardiogram.    - I will follow up with you about lab results.     - I will talk to Dr. Garcia and follow up with you.    Before Your Surgery      Call your surgeon if there is any change in your health. This includes signs of a cold or flu (such as a sore throat, runny nose, cough, rash or fever).    Do not smoke, drink alcohol or take over the counter medicine (unless your surgeon or primary care doctor tells you to) for the 24 hours before and after surgery.    If you take prescribed drugs: Follow your doctor s orders about which medicines to take and which to stop until after surgery.    Eating and drinking prior to surgery: follow the instructions from your surgeon    Take a shower or bath the night before surgery. Use the soap your surgeon gave you to gently clean your skin. If you do not have soap from your surgeon, use your regular soap. Do not shave or scrub the surgery site.  Wear clean pajamas and have clean sheets on your bed.     [facility]    If you have any questions regarding to your visit please contact your care team:     Team Pink:   Clinic Hours Telephone Number   Internal Medicine:  Dr. Raina Tran, NP       7am-7pm  Monday - Thursday   7am-5pm  Fridays  [phone]  (Appointment scheduling available 24/7)    Questions about your visit?  Team Line  [phone]   [facility] - 11am-9pm Monday-Friday Saturday-Sunday- 9am-5pm   [place] - 5pm-9pm Monday-Friday Saturday-Sunday- 9am-5pm  [phone] - [place]   [phone] - [place]       What options do I have for visits at the clinic other than the traditional office visit?  To expand how we care for you, many of our providers are utilizing electronic visits (e-visits) and telephone visits, when medically appropriate, for interactions with their patients rather than a visit in the clinic.   We also offer nurse visits for  many medical concerns. Just like any other service, we will bill your insurance company for this type of visit based on time spent on the phone with your provider. Not all insurance companies cover these visits. Please check with your medical insurance if this type of visit is covered. You will be responsible for any charges that are not paid by your insurance.      E-visits via Message Systemshart:  generally incur a $35.00 fee.  Telephone visits:  Time spent on the phone: *charged based on time that is spent on the phone in increments of 10 minutes. Estimated cost:   5-10 mins $30.00   11-20 mins. $59.00   21-30 mins. $85.00   Use [employer] (secure email communication and access to your chart) to send your primary care provider a message or make an appointment. Ask someone on your Team how to sign up for [employer].    For a Price Quote for your services, please call our [employer] Line at [phone].    As always, Thank you for trusting us with your health care needs!    Discharge by HENRIQUE MI     Before Your Surgery      Call your surgeon if there is any change in your health. This includes signs of a cold or flu (such as a sore throat, runny nose, cough, rash or fever).    Do not smoke, drink alcohol or take over the counter medicine (unless your surgeon or primary care doctor tells you to) for the 24 hours before and after surgery.    If you take prescribed drugs: Follow your doctor s orders about which medicines to take and which to stop until after surgery.    Eating and drinking prior to surgery: follow the instructions from your surgeon    Take a shower or bath the night before surgery. Use the soap your surgeon gave you to gently clean your skin. If you do not have soap from your surgeon, use your regular soap. Do not shave or scrub the surgery site.  Wear clean pajamas and have clean sheets on your bed.

## 2017-11-28 NOTE — MR AVS SNAPSHOT
After Visit Summary   11/28/2017    Juan Antonio Sierra    MRN: [id number]           Patient Information     Date Of Birth          1935        Visit Information        Provider Department      11/28/2017 12:10 PM Gianni Wright MD [facility]        Today's Diagnoses     Preop general physical exam    -  1    Need for prophylactic vaccination and inoculation against influenza        Need for prophylactic vaccination with tetanus-diphtheria (TD)        Congestive heart failure, unspecified congestive heart failure chronicity, unspecified congestive heart failure type (H)        Generalized weakness        Chronic atrial fibrillation (H)        Other emphysema (H)        Ischemic cardiomyopathy        CKD (chronic kidney disease) stage 3, GFR 30-59 ml/min          Care Instructions    - Follow up for an echocardiogram.    - I will follow up with you about lab results.     - I will talk to Dr. Garcia and follow up with you.    Before Your Surgery      Call your surgeon if there is any change in your health. This includes signs of a cold or flu (such as a sore throat, runny nose, cough, rash or fever).    Do not smoke, drink alcohol or take over the counter medicine (unless your surgeon or primary care doctor tells you to) for the 24 hours before and after surgery.    If you take prescribed drugs: Follow your doctor s orders about which medicines to take and which to stop until after surgery.    Eating and drinking prior to surgery: follow the instructions from your surgeon    Take a shower or bath the night before surgery. Use the soap your surgeon gave you to gently clean your skin. If you do not have soap from your surgeon, use your regular soap. Do not shave or scrub the surgery site.  Wear clean pajamas and have clean sheets on your bed.     [facility]    If you have any questions regarding to your visit please contact your care team:     Team Pink:   Clinic Hours  Telephone Number   Internal Medicine:  Dr. Raina Tran NP       7am-7pm  Monday - Thursday   7am-5pm  Fridays  [phone]  (Appointment scheduling available 24/7)    Questions about your visit?  Team Line  [phone]   Urgent Care - [place] [facility] - 11am-9pm Monday-Friday Saturday-Sunday- 9am-5pm   [place] - 5pm-9pm Monday-Friday Saturday-Sunday- 9am-5pm  [phone] - [place]   [phone] - [place]       What options do I have for visits at the clinic other than the traditional office visit?  To expand how we care for you, many of our providers are utilizing electronic visits (e-visits) and telephone visits, when medically appropriate, for interactions with their patients rather than a visit in the clinic.   We also offer nurse visits for many medical concerns. Just like any other service, we will bill your insurance company for this type of visit based on time spent on the phone with your provider. Not all insurance companies cover these visits. Please check with your medical insurance if this type of visit is covered. You will be responsible for any charges that are not paid by your insurance.      E-visits via [employer]:  generally incur a $35.00 fee.  Telephone visits:  Time spent on the phone: *charged based on time that is spent on the phone in increments of 10 minutes. Estimated cost:   5-10 mins $30.00   11-20 mins. $59.00   21-30 mins. $85.00   Use [employer] (secure email communication and access to your chart) to send your primary care provider a message or make an appointment. Ask someone on your Team how to sign up for [employer]    For a Price Quote for your services, please call our Consumer Price Line at [phone].    As always, Thank you for trusting us with your health care needs!    Discharge by HENRIQUE MI             Follow-ups after your visit        Your next 10 appointments already scheduled     Dec 19, 2017 10:00 AM CST    Return Visit with Rogelio Castillo MD   [facility]    [address] [address] [phone]              Future tests that were ordered for you today     Open Future Orders        Priority Expected Expires Ordered    Echocardiogram Complete Routine  11/28/2018 11/28/2017            Who to contact     If you have questions or need follow up information about today's clinic visit or your schedule please contact [facility] directly at [phone].  Normal or non-critical lab and imaging results will be communicated to you by [employer], letter or phone within 4 business days after the clinic has received the results. If you do not hear from us within 7 days, please contact the clinic through [employer] or phone. If you have a critical or abnormal lab result, we will notify you by phone as soon as possible.  Submit refill requests through [employer] or call your pharmacy and they will forward the refill request to us. Please allow 3 business days for your refill to be completed.          Additional Information About Your Visit        [employer] Information     [employer] gives you secure access to your electronic health record. If you see a primary care provider, you can also send messages to your care team and make appointments. If you have questions, please call your primary care clinic.  If you do not have a primary care provider, please call [phone] and they will assist you.        Care EveryWhere ID     This is your Care EveryWhere ID. This could be used by other organizations to access your [place] medical records  [id number]        Your Vitals Were     Pulse Temperature Pulse Oximetry BMI (Body Mass Index)          81 97.4  F (36.3  C) (Oral) 95% 24.37 kg/m2         Blood Pressure from Last 3 Encounters:   11/28/17 100/58   07/20/17 112/80   12/22/16 (!) 82/48    Weight from Last 3 Encounters:   11/28/17 165 lb (74.8  kg)   10/24/17 164 lb (74.4 kg)   09/19/17 155 lb (70.3 kg)              We Performed the Following     BNP-N terminal pro     CBC with platelets differential     Comprehensive metabolic panel     Digoxin level     EKG 12-lead complete w/read - Clinics     EKG 12-lead complete w/read - Clinics     TSH with free T4 reflex        Primary Care Provider Office Phone # Fax #    Gianni Wright [phone] [address] [address]        Equal Access to Services     [facility] : Hadii aad ku hadasho Soomaali, waaxda luqadaha, qaybta kaalmada adeegyada, waxay idiin hayaan adeedin ward . So [facility] [phone].    ATENCIÓN: Si habla español, tiene a ness disposición servicios gratuitos de asistencia lingüística. [facility] [phone].    We comply with applicable federal civil rights laws and [place] laws. We do not discriminate on the basis of race, color, national origin, age, disability, sex, sexual orientation, or gender identity.            Thank you!     Thank you for choosing [facility]  for your care. Our goal is always to provide you with excellent care. Hearing back from our patients is one way we can continue to improve our services. Please take a few minutes to complete the written survey that you may receive in the mail after your visit with us. Thank you!             Your Updated Medication List - Protect others around you: Learn how to safely use, store and throw away your medicines at www.disposemymeds.org.          This list is accurate as of: 11/28/17  1:08 PM.  Always use your most recent med list.                   Brand Name Dispense Instructions for use Diagnosis    amiodarone 200 MG tablet    PACERONE/CODARONE     Take 100 mg by mouth daily        aspirin 81 MG tablet      Take 1 tablet by mouth daily.        B-12 2500 MCG Tabs      1 daily        carvedilol 12.5 MG tablet    COREG    60 tablet    Take 1 tablet (12.5 mg) by mouth 2 times  daily (with meals)    Chronic congestive heart failure, unspecified congestive heart failure type (H)       fish oil-omega-3 fatty acids 1000 MG capsule      Take 2 g by mouth daily        furosemide 80 MG tablet    LASIX    90 tablet    Take 1 tablet (80 mg) by mouth daily    Congestive heart failure, unspecified congestive heart failure chronicity, unspecified congestive heart failure type (H), Hypertension goal BP (blood pressure) < 140/90       iron 325 (65 FE) MG tablet      Take 1 tablet by mouth daily        levothyroxine 75 MCG tablet    SYNTHROID/LEVOTHROID    90 tablet    TAKE 1 TABLET BY MOUTH ONCE DAILY    Hypothyroidism, unspecified type       lisinopril 2.5 MG tablet    PRINIVIL/Zestril    90 tablet    Take 1 tablet (2.5 mg) by mouth daily    Acute ST elevation myocardial infarction (STEMI), unspecified artery (H)       MAGNESIUM OXIDE PO           Multiple vitamin Caps      1 daily        NITROSTAT SL      Place 0.4 mg under the tongue        potassium chloride SA 20 MEQ CR tablet    K-DUR/KLOR-CON M     Take 1 tablet by mouth daily        vitamin D3 2000 UNITS Caps      1 daily        warfarin 5 MG tablet    COUMADIN    90 tablet    Take 1/2 tablet (2.5 mg ) daily or as directed by ACC nurse    Atrial fibrillation (H)

## 2017-11-28 NOTE — PROGRESS NOTES
"[facility]  [address] [address]  [phone]  Dept: [phone]    PRE-OP EVALUATION:  Today's date: 2017    Juan Antonio Sierra (: 1935) presents for pre-operative evaluation assessment as requested by Dr. Carl.  He requires evaluation and anesthesia risk assessment prior to undergoing surgery/procedure for treatment of carpal tunnel .  Proposed procedure: Carpal tunnel release surgery    Date of Surgery/ Procedure: 2017  Time of Surgery/ Procedure: [facility]  Hospital/Surgical Facility: [facility]   Primary Physician: Gianni Wrihgt  Type of Anesthesia Anticipated: to be determined    Patient has a Health Care Directive or Living Will:  YES -\"probably\"    1. YES - DO YOU HAVE A HISTORY OF HEART ATTACK, STROKE, STENT, BYPASS OR SURGERY ON AN ARTERY IN THE HEAD, NECK, HEART OR LEG?   Many different issues ; chronic medical problems of     1. Coronary artery disease.  2. Ischemic cardiomyopathy, EF of 45 to 50%, which was previously noted at 20%.    Status post Bi-V ICD upgrade 2014.  3. History of PVC's inhibiting Bi-V pacing on amiodarone.  4. Atrial fibrillation and atrial flutter, status post AV node ablation 2016.   5. History of CVA/TIA, maintained on warfarin.  6. Pulmonary fibrosis.  7. Hypothyroidism.  8. Hypertension.  9. Depression.  10. Elevated LFT's in 2016.   Seen regularly by [facility] [place] , most recent previous office visit with Mar Brown, Cardiology  Nurse practitioner on 2017. His cardiomyopathy has shown an % ejection fraction as low as 20% but most recently his echocardiogram from 2016 showed an % ejection fraction improvement to 45%.    2. NO - Do you ever have any pain or discomfort in your chest?  3. NO - Do you have a history of  Heart Failure?  4. YES - ARE YOUR TROUBLED BY SHORTNESS OF BREATH WHEN WALKING ON THE LEVEL, UP A SLIGHT HILL OR AT NIGHT? A real concern to me " is that patient concedes substantial generalized fatigue and worsened shortness of breath over last few weeks to 2 months and virtually to weak to walk, using walker with difficulty   5. NO - Do you currently have a cold, bronchitis or other respiratory infection?  6. NO - Do you have a cough, shortness of breath or wheezing?  7. NO - Do you sometimes get pains in the calves of your legs when you walk?  8. NO - Do you or anyone in your family have previous history of blood clots?  9. YES - DO YOU OR DOES ANYONE IN YOUR FAMILY HAVE A SERIOUS BLEEDING PROBLEM SUCH AS PROLONGED BLEEDING FOLLOWING SURGERIES OR CUTS? Patient is on coumadin   10. NO - Have you ever had problems with anemia or been told to take iron pills?  11. NO - Have you had any abnormal blood loss such as black, tarry or bloody stools, or abnormal vaginal bleeding?  12. YES - HAVE YOU EVER HAD A BLOOD TRANSFUSION? After heart surgery   13. NO - Have you or any of your relatives ever had problems with anesthesia?  14. YES - DO YOU HAVE SLEEP APNEA, EXCESSIVE SNORING OR DAYTIME DROWSINESS? Daytime drowsiness  15. NO - Do you have any prosthetic heart valves?  16. NO - Do you have prosthetic joints?  17. NO - Is there any chance that you may be pregnant?    HPI:                                                      Brief HPI related to upcoming procedure: Juan Antonio Sierra is a 81 year old male who presents with his wife to the clinic for a preoperative exam prior to a carpal tunnel release surgery. Patient states he believes he developed peripheral neuropathy and has began taking Vitamin E. He follows up with a cardiologist about every 6 months. He was doing better this summer and over the last few weeks he has been getting worse. His legs have been getting weaker and are numb, symptoms are suggestive of a peripheral neuropathy. He has some SOB with exertion. Reports he had a brief cold that is resolving now. Denies chest tightness, lightheadedness, and  coughing.    See problem list for active medical problems.  Problems all longstanding and stable, except as noted/documented.  See ROS for pertinent symptoms related to these conditions.    MEDICAL HISTORY:                                                    Patient Active Problem List    Diagnosis Date Noted     Bilateral carpal tunnel syndrome 10/24/2017     Priority: Medium     Acquired hypothyroidism 07/20/2017     Priority: Medium     IPF (idiopathic pulmonary fibrosis) (H) 12/22/2016     Priority: Medium     CKD (chronic kidney disease) stage 3, GFR 30-59 ml/min 11/08/2016     Priority: Medium     Other emphysema (H) 03/08/2016     Priority: Medium     Ischemic cardiomyopathy 03/08/2016     Priority: Medium     Congestive heart failure, unspecified congestive heart failure chronicity, unspecified congestive heart failure type (H) 02/12/2016     Priority: Medium     Major depressive disorder, recurrent episode, moderate (H) 02/12/2016     Priority: Medium     High risk medications (not anticoagulants) long-term use 12/11/2013     Priority: Medium     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 Pt has an increased need for coumadin   Diagnosis updated by automated process. Provider to review and confirm.       Health Care Home 07/31/2012     Priority: Medium     Philip Mario RN,C--[phone]   [facility]        DX V65.8 REPLACED WITH [id number] HEALTH CARE HOME (04/08/2013)       Atrial fibrillation (H)      Priority: Medium     AMI (acute myocardial infarction)      Priority: Medium     X2       Arthritis      Priority: Medium     Hyperlipidemia LDL goal <100      Priority: Medium     Hypertension goal BP (blood  pressure) < 140/90      Priority: Medium     S/P CABG (coronary artery bypass graft)      Priority: Medium     1983, 1998, first a 5 v cabg, then a 3v cabg        Past Medical History:   Diagnosis Date     AMI (acute myocardial infarction)     X2     Arthritis      Atrial fibrillation (H)      CABG (coronary artery bypass graft) 1983, 1998    first a 5 v cabg, then a 3v cabg     CHF (congestive heart failure) (H)      Hyperlipidemia LDL goal <100      Hypertension goal BP (blood pressure) < 140/90      Moderate major depression (H)      Pacemaker 05/2011     Past Surgical History:   Procedure Laterality Date     CATHETER, ABLATION  July 2012    for atrial fibrillation      CORONARY ARTERY BYPASS  1983 (5v)  -  1998 (3v)    cabg @ [facility] and a redo     IMPLANT PACEMAKER  6/2012     Laminectomy - T11 to L5  aug 26th, 2015    [place]     STENT, CORONARY, TREE  6/3/2011    stenting of the LAD by the mammary artery graft in 2011     TONSILLECTOMY       Current Outpatient Prescriptions   Medication Sig Dispense Refill     carvedilol (COREG) 12.5 MG tablet Take 1 tablet (12.5 mg) by mouth 2 times daily (with meals) 60 tablet 2     levothyroxine (SYNTHROID/LEVOTHROID) 75 MCG tablet TAKE 1 TABLET BY MOUTH ONCE DAILY 90 tablet 2     furosemide (LASIX) 80 MG tablet Take 1 tablet (80 mg) by mouth daily 90 tablet 1     lisinopril (PRINIVIL/ZESTRIL) 2.5 MG tablet Take 1 tablet (2.5 mg) by mouth daily 90 tablet 1     warfarin (COUMADIN) 5 MG tablet Take 1/2 tablet (2.5 mg ) daily or as directed by ACC nurse 90 tablet 1     Ferrous Sulfate (IRON) 325 (65 FE) MG tablet Take 1 tablet by mouth daily       MAGNESIUM OXIDE PO        Nitroglycerin (NITROSTAT SL) Place 0.4 mg under the tongue       potassium chloride SA (K-DUR,KLOR-CON M) 20 MEQ tablet Take 1 tablet by mouth daily       fish oil-omega-3 fatty acids (FISH OIL) 1000 MG capsule Take 2 g by mouth daily       Cholecalciferol (VITAMIN D3) 2000 UNITS CAPS 1 daily       Cyanocobalamin  (B-12) 2500 MCG TABS 1 daily       Multiple vitamin CAPS 1 daily       amiodarone (PACERONE/CODARONE) 200 MG tablet Take 100 mg by mouth daily        aspirin 81 MG tablet Take 1 tablet by mouth daily.       OTC products: None, except as noted above    Allergies   Allergen Reactions     Hay Fever & [A.R.M.]       Latex Allergy: NO    Social History   Substance Use Topics     Smoking status: Former Smoker     Types: Pipe, Cigars     Start date: 1/1/1947     Quit date: 8/18/2015     Smokeless tobacco: Never Used     Alcohol use Yes      Comment: 0-1 drinks of brigitte in evening     History   Drug Use No     REVIEW OF SYSTEMS:                                                    Constitutional, HEENT, cardiovascular, pulmonary, GI, , musculoskeletal, neuro, skin, endocrine and psych systems are negative, except as in HPI or otherwise noted     This document serves as a record of the services and decisions personally performed and made by Gianni Wright MD. It was created on their behalf by Robby Marshall, a trained medical scribe. The creation of this document is based the provider's statements to the medical scribe.  Robby Marshall November 28, 2017 12:52 PM    EXAM:                                                    /58 (BP Location: Left arm, Patient Position: Sitting)  Pulse 81  Temp 97.4  F (36.3  C) (Oral)  Wt 74.8 kg (165 lb)  SpO2 95%  BMI 24.37 kg/m2    GENERAL APPEARANCE: healthy, alert and no distress, significantly weak     EYES: EOMI,  PERRL     HENT: ear canals and TM's normal and nose and mouth without ulcers or lesions     NECK: no adenopathy, no asymmetry, masses, or scars and thyroid normal to palpation     RESP: lungs clear to auscultation - no rales, rhonchi or wheezes     CV: regular rates and rhythm, normal S1 S2, no S3 or S4 and no murmur, click or rub     ABDOMEN:  soft, nontender, no HSM or masses and bowel sounds normal     MS: extremities normal- no gross deformities noted     SKIN:  no suspicious lesions or rashes to visible skin      NEURO: mentation intact and speech normal     PSYCH: mentation appears normal. and affect normal/bright     LYMPHATICS: No axillary, cervical, or supraclavicular nodes    DIAGNOSTICS:                                                      Orders Placed This Encounter   Procedures     Digoxin level     TSH with free T4 reflex     CBC with platelets differential     Comprehensive metabolic panel     BNP-N terminal pro     EKG 12-lead complete w/read - Clinics     EKG 12-lead complete w/read - Clinics     Echocardiogram Complete         Recent Labs   Lab Test  07/20/17   1134  12/22/16   1329  06/02/16 04/28/16   10/23/15   1243  08/09/12   HGB  12.6*  13.6   --    --    --    --   13.3   < >   --    PLT  152   --    --    --    --    --   135*   < >   --    INR   --    --    --   2.1*  2.7*   < >   --    < >   --    NA  138  136   < >   --    --    < >  139   < >  138   POTASSIUM  4.0  4.1   < >   --    --    < >  4.2   < >  4.4   CR  1.08  1.62*   < >   --    --    < >  0.99   < >  0.9   A1C   --    --    --    --    --    --    --    --   5.8    < > = values in this interval not displayed.     IMPRESSION:                                                    Reason for surgery/procedure: carpal tunnel syndrome  Diagnosis/reason for consult: assess and minimize preoperative risk per consulting surgeon     The proposed surgical procedure is considered INTERMEDIATE risk    REVISED CARDIAC RISK INDEX  The patient has the following serious cardiovascular risks for perioperative complications such as (MI, PE, VFib and 3  AV Block):  Coronary Artery Disease (MI, positive stress test, angina, Qs on EKG)  Congestive Heart Failure (pulmonary edema, PND, s3 leland, CXR with pulmonary congestion, basilar rales)  Cerebrovascular Disease (TIA or CVA)  INTERPRETATION: 3 risks: Class IV (high risk - >11% complication rate)    The patient has the following additional risks for  perioperative complications:        ICD-10-CM    1. Preop general physical exam Z01.818 Digoxin level     EKG 12-lead complete w/read - Clinics     TSH with free T4 reflex     CBC with platelets differential     Comprehensive metabolic panel     Echocardiogram Complete     EKG 12-lead complete w/read - Clinics     BNP-N terminal pro   2. Need for prophylactic vaccination and inoculation against influenza Z23    3. Need for prophylactic vaccination with tetanus-diphtheria (TD) Z23    4. Congestive heart failure, unspecified congestive heart failure chronicity, unspecified congestive heart failure type (H) I50.9    5. Generalized weakness R53.1    6. Chronic atrial fibrillation (H) I48.2 Digoxin level   7. Other emphysema (H) J43.8    8. Ischemic cardiomyopathy I25.5 Digoxin level     TSH with free T4 reflex     CBC with platelets differential     Comprehensive metabolic panel     Echocardiogram Complete     EKG 12-lead complete w/read - Clinics     BNP-N terminal pro   9. CKD (chronic kidney disease) stage 3, GFR 30-59 ml/min N18.3 Comprehensive metabolic panel     RECOMMENDATIONS:                                                          --Patient is to take all scheduled medications on the day of surgery EXCEPT for modifications listed below. - presuming he is eventually cleared for this surgery , he would need to hold coumadin for 5 days prior to the planned procedure and hold furosemide [ Lasix ] am of procedure , however I feel this patient is an extremely high risk patient for this procedure and want to see if his % ejection fraction has declined again in a major way and I want to review this case with Dr. Jesus Garcia, cardiologist with [facility] [place] , his primary care cardiologist. Unless Dr. Garcia says he feels this is suitable for patient I won't complete an approval for this patient       APPROVAL NOT GIVEN to proceed with proposed procedure, until further diagnostic evaluation  is completed and cardiology opinion given - note; cardiologist Dr. Garcia reviewed his record and patient is cleared for carpal tunnel surgery.       The information in this document, created by the medical scribe for me, accurately reflects the services I personally performed and the decisions made by me. I have reviewed and approved this document for accuracy.   Gianni Wright MD     Signed Electronically by: Gianni Wright MD    Copy of this evaluation report is provided to requesting physician.    [facility] Preop Guidelines

## 2017-11-29 ENCOUNTER — TELEPHONE (OUTPATIENT)
Dept: FAMILY MEDICINE | Facility: CLINIC | Age: 82
End: 2017-11-29

## 2017-11-29 ENCOUNTER — TELEPHONE (OUTPATIENT)
Dept: INTERNAL MEDICINE | Facility: CLINIC | Age: 82
End: 2017-11-29

## 2017-11-29 NOTE — TELEPHONE ENCOUNTER
See other message from today.  We are waiting to hear back from Dr. Garcia.  Patient called and informed. Selina Alfonso,

## 2017-11-29 NOTE — TELEPHONE ENCOUNTER
"Need to chat with Dr. Garcia with [facility] or just have him read my preoperative history and physical examination conclusion. Patient needs either a formal cardiac clearance for his surgery or possibly Dr. Garcia can \" give the ok \". We await workup/test results.  Gianni Wright MD  "

## 2017-11-29 NOTE — TELEPHONE ENCOUNTER
Preop and EKG faxed to Dr. Jesus Garcia at [phone], with note asking if he would okay him for surgery or if patient would need to be seen for cardiac clearance.  Will await his response. Selina Alfonso,

## 2017-11-29 NOTE — TELEPHONE ENCOUNTER
Reason for call: question  Patient called regarding (reason for call): Patient is calling to ask if someone could call him back about a appointment that was supposed to be scheduled with with Dr Garcia at [facility] for a echocardo cardiogram yesterday  Additional comments: Please call patient to discuss further      Phone number to reach patient:  Home number on file [phone] (home)    Best Time:  anytime    Can we leave a detailed message on this number?  YES

## 2017-12-01 ENCOUNTER — TELEPHONE (OUTPATIENT)
Dept: FAMILY MEDICINE | Facility: CLINIC | Age: 82
End: 2017-12-01

## 2017-12-01 NOTE — TELEPHONE ENCOUNTER
Message left yesterday for DEANDRE Cabello.  She called back to state she was paging Dr. Garcia.  No response yet.  LM again today. Selina Alfonso,

## 2017-12-01 NOTE — TELEPHONE ENCOUNTER
Reason for call: questions  Patient called regarding (reason for call): He is wondering when and where his surgery with Dr Castillo is and if he has to hold his warfarin and with what?  Additional comments: Please call patient to discuss further      Phone number to reach patient:  Home number on file [phone] (home)    Best Time:  anytime    Can we leave a detailed message on this number?  YES

## 2017-12-01 NOTE — TELEPHONE ENCOUNTER
Excellent . Lets make sure patient is aware and I will update his preoperative history and physical examination     Gianni Wright MD

## 2017-12-01 NOTE — TELEPHONE ENCOUNTER
Patient is having surgery at [facility].  Patient called and informed.  Informed patient he will need to call [facility] regarding his warfarin.  Selina Alfonso,

## 2017-12-01 NOTE — TELEPHONE ENCOUNTER
Reason for Call:  Other returning call    Detailed comments:  Patient calling to see if the echo results are back yet. Please call and let know.     Phone Number Patient can be reached at: Home number on file [phone] (home)    Best Time:  Any     Can we leave a detailed message on this number? YES    Call taken on 12/1/2017 at 1:15 PM by Angela Nolasco

## 2017-12-01 NOTE — TELEPHONE ENCOUNTER
Received call back from RN with Dr. Garcia.  Dr. Garcia reviewed his record and patient is cleared for carpal tunnel surgery.  Will route to Dr. Wright to inform.  Route back to team so I can fax preop.    Gianni Dugan will be calling patient back with his echo results and will let him know he is cleared for surgery.  Selina Alfonso,

## 2017-12-18 ENCOUNTER — TELEPHONE (OUTPATIENT)
Dept: INTERNAL MEDICINE | Facility: CLINIC | Age: 82
End: 2017-12-18

## 2017-12-18 NOTE — TELEPHONE ENCOUNTER
Reason for Call:  Other prescription    Detailed comments: Please call to discuss renewal of oxygen. [employer] fax is [phone].     Phone Number Patient can be reached at: Home number on file [phone] (home)    Best Time: any    Can we leave a detailed message on this number? YES    Call taken on 12/18/2017 at 2:58 PM by Debbie Roa

## 2017-12-18 NOTE — TELEPHONE ENCOUNTER
Called pt. States he received a letter from [facility] oxygen who provides his oxygen. Needs this reinstated each year, so needs something completed stating it is still required. He could not tell writer how many Liters he uses each time he needs it. States If he does anything, is out of breath. Has to rest before he can get to the O2. Fax information and that he requires the oxygen.     Called [employer] at [phone] and spoke with intake. They informed me that they have a note on his record that says: 10/26/17 about oxygen renewal. Needs office visit to show continued use and to support the need of oxygen. Person I spoke with requested that I speak with someone in documentation about this. Left a message requesting call back.    Fariha Jackman RN  [facility]

## 2017-12-19 ENCOUNTER — OFFICE VISIT (OUTPATIENT)
Dept: ORTHOPEDICS | Facility: CLINIC | Age: 82
End: 2017-12-19
Payer: COMMERCIAL

## 2017-12-19 VITALS — HEIGHT: 69 IN | BODY MASS INDEX: 23.7 KG/M2 | WEIGHT: 160 LBS | RESPIRATION RATE: 15 BRPM

## 2017-12-19 DIAGNOSIS — G56.03 BILATERAL CARPAL TUNNEL SYNDROME: Primary | ICD-10-CM

## 2017-12-19 PROCEDURE — 99024 POSTOP FOLLOW-UP VISIT: CPT | Performed by: ORTHOPAEDIC SURGERY

## 2017-12-19 NOTE — MR AVS SNAPSHOT
"              After Visit Summary   12/19/2017    Juan Antonio Sierra    MRN: [id number]           Patient Information     Date Of Birth          1935        Visit Information        Provider Department      12/19/2017 10:00 AM Rogelio Castillo MD [facility]        Care Instructions    Start scar massage with vitamin-E or aloe vera cream.  Use night splint for 4 weeks.    Resume activity as tolerated.  Return to clinic 4 weeks            Follow-ups after your visit        Who to contact     If you have questions or need follow up information about today's clinic visit or your schedule please contact [facility] directly at [phone].  Normal or non-critical lab and imaging results will be communicated to you by MyChart, letter or phone within 4 business days after the clinic has received the results. If you do not hear from us within 7 days, please contact the clinic through [facility] or phone. If you have a critical or abnormal lab result, we will notify you by phone as soon as possible.  Submit refill requests through [facility] [employer] or call your pharmacy and they will forward the refill request to us. Please allow 3 business days for your refill to be completed.          Additional Information About Your Visit        MyChart Information     [employer] [facility] gives you secure access to your electronic health record. If you see a primary care provider, you can also send messages to your care team and make appointments. If you have questions, please call your primary care clinic.  If you do not have a primary care provider, please call [phone] and they will assist you.        Care EveryWhere ID     This is your Care EveryWhere ID. This could be used by other organizations to access your [place] medical records  [id number]        Your Vitals Were     Respirations Height BMI (Body Mass Index)             15 1.753 m (5' 9\") 23.63 kg/m2          Blood Pressure from Last 3 Encounters: "   11/28/17 100/58   07/20/17 112/80   12/22/16 (!) 82/48    Weight from Last 3 Encounters:   12/19/17 72.6 kg (160 lb)   11/28/17 74.8 kg (165 lb)   10/24/17 74.4 kg (164 lb)              Today, you had the following     No orders found for display       Primary Care Provider Office Phone # Fax #    Gianni Wright [phone]       [address] [facility]  [facility] [address]        Equal Access to Services     : Hadii aad ku hadasho Soomaali, waaxda luqadaha, qaybta kaalmada adeegyada, waxaubrie ward . So [facility] [phone].    ATENCIÓN: Si habla español, tiene a ness disposición servicios gratuitos de asistencia lingüística. [facility] [phone].    We comply with applicable federal civil rights laws and [place] laws. We do not discriminate on the basis of race, color, national origin, age, disability, sex, sexual orientation, or gender identity.            Thank you!     Thank you for choosing [facility]  for your care. Our goal is always to provide you with excellent care. Hearing back from our patients is one way we can continue to improve our services. Please take a few minutes to complete the written survey that you may receive in the mail after your visit with us. Thank you!             Your Updated Medication List - Protect others around you: Learn how to safely use, store and throw away your medicines at www.disposemymeds.org.          This list is accurate as of: 12/19/17 10:29 AM.  Always use your most recent med list.                   Brand Name Dispense Instructions for use Diagnosis    amiodarone 200 MG tablet    PACERONE/CODARONE     Take 100 mg by mouth daily        aspirin 81 MG tablet      Take 1 tablet by mouth daily.        B-12 2500 MCG Tabs      1 daily        carvedilol 12.5 MG tablet    COREG    60 tablet    Take 1 tablet (12.5 mg) by mouth 2 times daily (with meals)    Chronic congestive heart failure, unspecified  congestive heart failure type (H)       fish oil-omega-3 fatty acids 1000 MG capsule      Take 2 g by mouth daily        furosemide 80 MG tablet    LASIX    90 tablet    Take 1 tablet (80 mg) by mouth daily    Congestive heart failure, unspecified congestive heart failure chronicity, unspecified congestive heart failure type (H), Hypertension goal BP (blood pressure) < 140/90       iron 325 (65 FE) MG tablet      Take 1 tablet by mouth daily        levothyroxine 75 MCG tablet    SYNTHROID/LEVOTHROID    90 tablet    TAKE 1 TABLET BY MOUTH ONCE DAILY    Hypothyroidism, unspecified type       lisinopril 2.5 MG tablet    PRINIVIL/Zestril    90 tablet    Take 1 tablet (2.5 mg) by mouth daily    Acute ST elevation myocardial infarction (STEMI), unspecified artery (H)       MAGNESIUM OXIDE PO           Multiple vitamin Caps      1 daily        NITROSTAT SL      Place 0.4 mg under the tongue        potassium chloride SA 20 MEQ CR tablet    K-DUR/KLOR-CON M     Take 1 tablet by mouth daily        vitamin D3 2000 UNITS Caps      1 daily        warfarin 5 MG tablet    COUMADIN    90 tablet    Take 1/2 tablet (2.5 mg ) daily or as directed by [facility] nurse    Atrial fibrillation (H)

## 2017-12-19 NOTE — NURSING NOTE
"Chief Complaint   Patient presents with     Surgical Followup     Follow-up for left carpal tunnel release revision 12/6/17.       Initial Resp 15  Ht 1.753 m (5' 9\")  Wt 72.6 kg (160 lb)  BMI 23.63 kg/m2 Estimated body mass index is 23.63 kg/(m^2) as calculated from the following:    Height as of this encounter: 1.753 m (5' 9\").    Weight as of this encounter: 72.6 kg (160 lb).  Medication Reconciliation: complete     Sergey Noel PA-C  Supervising physician: Rogelio Castillo MD  Dept. of Orthopedics  [facility]          "

## 2017-12-19 NOTE — LETTER
12/19/2017         RE: Juan Antonio Sierra  [address]  [place] [address]        Dear Colleague,    Thank you for referring your patient, Juan Antonio Sierra, to the [facility]. Please see a copy of my visit note below.    Follow up left carpal tunnel release, flexor synovectomy  on 12/6/17.  He reports no improvement in the left  Hand.  Interestingly, he does report that the right hand had resolution of all numbness and pain for the day of surgery after having the IV there.  Symptoms have now returned.  This could indicate an effect from circulation.  Splint is removed.  Wound is healing well.   Sutures are removed.  He  has unchanged numbness of fingers.  Difficult to use either hand with weakness.    Start scar massage with vitamin-E or aloe vera cream.  Use night splint for 4 weeks.    Test warm wrap to right arm to see if it leads to improvement.  Resume activity as tolerated.  Return to clinic 4 weeks      Again, thank you for allowing me to participate in the care of your patient.        Sincerely,        Rogelio Castillo MD

## 2017-12-19 NOTE — PROGRESS NOTES
Follow up left carpal tunnel release, flexor synovectomy  on 12/6/17.  He reports no improvement in the left  Hand.  Interestingly, he does report that the right hand had resolution of all numbness and pain for the day of surgery after having the IV there.  Symptoms have now returned.  This could indicate an effect from circulation.  Splint is removed.  Wound is healing well.   Sutures are removed.  He  has unchanged numbness of fingers.  Difficult to use either hand with weakness.    Start scar massage with vitamin-E or aloe vera cream.  Use night splint for 4 weeks.    Test warm wrap to right arm to see if it leads to improvement.  Resume activity as tolerated.  Return to clinic 4 weeks

## 2017-12-19 NOTE — PATIENT INSTRUCTIONS
Start scar massage with vitamin-E or aloe vera cream.  Use night splint for 4 weeks.    Resume activity as tolerated.  Return to clinic 4 weeks

## 2018-01-03 LAB
ALT SERPL-CCNC: 15 IU/L (ref 8–45)
AST SERPL-CCNC: 30 IU/L (ref 2–40)
CREAT SERPL-MCNC: 1.07 MG/DL (ref 0.72–1.25)
GFR SERPL CREATININE-BSD FRML MDRD: >60 ML/MIN/1.73M2
GLUCOSE SERPL-MCNC: 89 MG/DL (ref 65–100)
POTASSIUM SERPL-SCNC: 4.2 MMOL/L (ref 3.5–5)
TSH SERPL-ACNC: 0.45 UIU/ML (ref 0.35–4.94)

## 2018-01-05 NOTE — TELEPHONE ENCOUNTER
Called [facility] home oxygen. Pt is due for an oxygen evaluation. 2nd year they provided his oxygen and so he is due for a Face to face office visit with treating provider to make sure there is a continued need for continuuing billing.   Pt was seen by Dr. Fair 1/3/18 at [facility] and they received the supporting chart notes. Closing encounter. No further action needed.    Fariha Jackman RN  [facility]

## 2018-01-26 ENCOUNTER — TELEPHONE (OUTPATIENT)
Dept: INTERNAL MEDICINE | Facility: CLINIC | Age: 83
End: 2018-01-26

## 2018-01-26 DIAGNOSIS — M79.672 PAIN IN BOTH FEET: ICD-10-CM

## 2018-01-26 DIAGNOSIS — G56.03 BILATERAL CARPAL TUNNEL SYNDROME: Primary | ICD-10-CM

## 2018-01-26 DIAGNOSIS — R52 BODY ACHES: ICD-10-CM

## 2018-01-26 DIAGNOSIS — M79.671 PAIN IN BOTH FEET: ICD-10-CM

## 2018-01-26 NOTE — TELEPHONE ENCOUNTER
Reason for Call:  Referral    Detailed comments: Patient would like to discuss referral for acupuncture. Please call. States medication and surgery are not working.    Phone Number Patient can be reached at: Home number on file [phone] (home)    Best Time: any    Can we leave a detailed message on this number? YES    Call taken on 1/26/2018 at 2:47 PM by Debbie Roa

## 2018-01-26 NOTE — TELEPHONE ENCOUNTER
Patient would like to try acupuncture for his carpal tunnel  He would also like for acupuncture to address his feet and full body aches and pains that has been going on for months    Feroz Ling RN

## 2018-01-26 NOTE — TELEPHONE ENCOUNTER
This is ok with me. Please place orders as requested for acupuncture . This can be done via a [facility] referral unless he is specifically requesting a different referral , reroute for signing if necessary     Gianni Wright MD

## 2018-01-29 ENCOUNTER — TELEPHONE (OUTPATIENT)
Dept: PHYSICAL THERAPY | Facility: CLINIC | Age: 83
End: 2018-01-29

## 2018-01-29 DIAGNOSIS — G56.03 BILATERAL CARPAL TUNNEL SYNDROME: Primary | ICD-10-CM

## 2018-01-29 NOTE — TELEPHONE ENCOUNTER
Patient did not have a specific location in mind and was fine with whichever locations Dr. Wright would refer him to    Referral placed.    Please notify patient and give him number to schedule    See [facility] referral for acupuncture service  Call one number to schedule at any of the above locations: [phone]    Feroz Ling RN

## 2018-01-29 NOTE — TELEPHONE ENCOUNTER
Rec'd message to call pt with questions regarding acupuncture referral.  Pt described recent carpal tunnel surgery that was not helpful.  He notes neuropathy widespread and feels weak.  Has been through PT in the past.  Sounds as if hands are primary complaint and not necessarily targeting acupuncture.  Pt indicated would like to pursue hand therapy as next step to evaluate his hand function and consider acupuncture beyond that if not helpful.

## 2018-01-29 NOTE — TELEPHONE ENCOUNTER
Spoke to patient and informed him of below message. Number given to patient and patient will call to set up appointment.  Fariha MURRIETA CMA ([facility])

## 2018-02-01 ENCOUNTER — THERAPY VISIT (OUTPATIENT)
Dept: CHIROPRACTIC MEDICINE | Facility: CLINIC | Age: 83
End: 2018-02-01
Payer: COMMERCIAL

## 2018-02-01 DIAGNOSIS — M25.531 PAIN IN BOTH WRISTS: Primary | ICD-10-CM

## 2018-02-01 DIAGNOSIS — M25.532 PAIN IN BOTH WRISTS: Primary | ICD-10-CM

## 2018-02-01 NOTE — Clinical Note
After examining and speaking with Juan Antonio, it appears that he is not a good fit for chiropractic/acupuncture at this time.  He appears to be having more systemically based symptoms that  perhaps should be evaluated by neurology or endocrinology at this time.  We were unable to reproduce his symptoms during the examination and upon further questioning appears to be having a lot of under lying symptoms.

## 2018-02-01 NOTE — PROGRESS NOTES
Chiropractic Clinic Visit    PCP: Gianni Wright NADINE Sierra is a 82 year old male who is seen  in consultation at the request of  Gianni Wright M.D. presenting with bilateral wrist pain. Patient reports that the onset was 4 months When asked, patient denies:, falling, slipping or sleeping awkwardly.  Prior to onset, the patient was able to walk with his wheeled walker no pain. Patient notes that due to symptoms, they can only walk 5-10 minutes then wrists hurt. Juan Antonio Sierra notes bilateral wrist pain rated at a 4/10 and  prior to this onset it was 0/10.    Juan Antonio Sierra comes in today and reports that he is having bilateral wrist pain R>>L.  He also mention that he is having bilateral foot pain/numbness with foot drop.  He has stumbled a couple of times as a result.  HE also notes that he is has not been feeling well for a long period of time.  He energy level is low, he is having bilateral foot and hand pain and that he is losing weight.      Injury: none per pt, has had carpal tunnel surgery in 1988, and a release for the left on Dec 6, 2017.    Location of Pain: bilateral hands at the following level(s) Occiput, C1 , C2 , C3 , C4 , C5 , C6  and C7   Duration of Pain: 4 month(s)  Rating of Pain at worst: 5/10  Rating of Pain Currently: 3/10  Symptoms are better with: Nothing  Symptoms are worse with: extension, flexion, sitting, standing and stairs  Additional Features: weakness and pain in other joints       Health History  as reported by the patient:    How does the patient rate their own health:   Poor    Current or past medical history:   Depression, Heart problems, High blood pressure, Implanted device, Numbness/tingling, Pain at night/rest, Thyroid problems and Weakness    Medical allergies  None    Past Traumas/Surgeries  Heart: pacemaker 2012, Orthopedic: CTS 1998, CTS revision on left in Dec 2017.  Other:  Low back surgery laminectomy August 2015    Family History  This patient has no  significant family history    Medications:  High blood pressure, Thyroid and other:  Heart (Amiodipine)    Occupation:  retired    Primary home tasks:   Prolonged sitting and Prolonged standing    Barriers as home:   no railing on stairs and transportation    Additional health Issues:     See above in health history      Review of Systems  Musculoskeletal: as above  Remainder of review of systems is negative including constitutional, CV, pulmonary, GI, Skin and Neurologic except as noted in HPI or medical history.    Past Medical History:   Diagnosis Date     AMI (acute myocardial infarction)     X2     Arthritis      Atrial fibrillation (H)      CABG (coronary artery bypass graft) 1983, 1998    first a 5 v cabg, then a 3v cabg     CHF (congestive heart failure) (H)      Hyperlipidemia LDL goal <100      Hypertension goal BP (blood pressure) < 140/90      Moderate major depression (H)      Pacemaker 05/2011     Past Surgical History:   Procedure Laterality Date     CARPAL TUNNEL RELEASE RT/LT Left 12/06/2017    Rogelio Castillo MD at [facility] (Left CTR revision)     CATHETER, ABLATION  July 2012    for atrial fibrillation      CORONARY ARTERY BYPASS  1983 (5v)  -  1998 (3v)    cabg @ [facility] and a redo     IMPLANT PACEMAKER  6/2012     Laminectomy - T11 to L5  aug 26th, 2015    [place]     STENT, CORONARY, TREE  6/3/2011    stenting of the LAD by the mammary artery graft in 2011     TONSILLECTOMY         Objective  There were no vitals taken for this visit.    GENERAL APPEARANCE: alert and moderate distress   GAIT: NORMAL and walker  SKIN: no suspicious lesions or rashes  NEURO: Normal strength and tone, mentation intact and speech normal  PSYCH:  mentation appears normal and affect normal/bright    Juan Antonio was asked to complete the Neck Disability Index, today in the office. Patient declined to complete the NDI form..    Cervical Spine Exam    Range of Motion:         Cervical range of motion is decrease in  all direction    Inspection:         No visible deformity        normal lordotic curvature maintained    Tender:        upper border of trapezius    Non-Tender:        remainder of cervical spine area    Muscle strength:      Reflexes:       Sensation:       Sensation was reduce bilateral in hands both to touch and pin prock    Special Tests:        Lymphatics:        no edema noted in the upper extremities       Segmental spinal dysfunction/restrictions found at:  :  C6 Right rotation restricted  C7 Right rotation restricted  T1 Right rotation restricted  T2 Right rotation restricted.      The following soft tissue hypotonicities were observed:Traps: ache, dull pain and stiff, referred pain: no      Radiology:      Assessment:    1. Pain in both wrists        RX ordered/plan of care  Anticipated outcomes    After discussing the risk and benefits of care, it was decided not to treat Mr Sierra as he should be evaluated from either a neurological or endocrine.    Mr Sierra came to this office looking for help with how he was feeling.  He said that he did have wrist pain but he also has bilateral foot pain and in general he does not feel well and has not been feeling well for some time and that it is getting worse.  He feels tired all the time, he has this pressure feeling in both arms from his elbows to his hands, his feet are painful and going numb, he stumbles at times, he has been losing weight and struggles to maintain weight.    After examining his wrist and cervical region, there is not much that can be done from a chiropractic/acupuncture standpoint  as his symptoms could not be reproduced.  When looking at his systemic symptom profile, he might be better served being evaluated either with neurology or endocrinology at this time.      Recommendations:    Instructions:    Follow-up:  Follow up with either neurology or endocrinology.     Disclaimer: This note consists of symbols derived from keyboarding,  dictation and/or voice recognition software. As a result, there may be errors in the script that have gone undetected. Please consider this when interpreting information found in this chart.

## 2018-02-05 ENCOUNTER — TELEPHONE (OUTPATIENT)
Dept: INTERNAL MEDICINE | Facility: CLINIC | Age: 83
End: 2018-02-05

## 2018-02-05 DIAGNOSIS — M25.532 PAIN IN BOTH WRISTS: Primary | ICD-10-CM

## 2018-02-05 DIAGNOSIS — M25.531 PAIN IN BOTH WRISTS: Primary | ICD-10-CM

## 2018-02-05 NOTE — TELEPHONE ENCOUNTER
Reason for Call:  Other call back    Detailed comments: patient calling and asking for return call in regards to what the Chiropractor (Solitario) had recommended. Patient states he recommended and MRI and Neurology. Please contact patient.    Phone Number Patient can be reached at: Home number on file [phone] (home)    Best Time: any time    Can we leave a detailed message on this number? YES    Call taken on 2/5/2018 at 1:28 PM by Lashawn French

## 2018-02-05 NOTE — TELEPHONE ENCOUNTER
What makes the most sense to me is to go with a neurological consultation if patient is interested . If so, please place orders as requested with [facility]     Gianni Wright MD

## 2018-02-05 NOTE — TELEPHONE ENCOUNTER
Spoke with patient. He stated that the chiropractor was going to send DR. Wright office visit notes/recommendations    See 2/1/18 chiropractic OV notes    Feroz Ling RN

## 2018-02-05 NOTE — TELEPHONE ENCOUNTER
Neuro referral placed.  Please notify patient of Dr. Wright's recommendations    Comments   Your provider has referred you for the following:   Consult at [facility]: [facility] [phone]            Feroz Ling RN

## 2018-02-05 NOTE — TELEPHONE ENCOUNTER
Spoke to patient and informed him of below message. Number given to patient.   Fariha MURRIETA CMA ([facility])

## 2018-02-06 ENCOUNTER — TELEPHONE (OUTPATIENT)
Dept: NEUROLOGY | Facility: CLINIC | Age: 83
End: 2018-02-06

## 2018-02-06 NOTE — TELEPHONE ENCOUNTER
[facility] Call Center    Phone Message    May a detailed message be left on voicemail: yes    Reason for Call: Other: Scheduled patient for first available appointment, call back if incorrect, unable to verify correct provider with clinic.     Action Taken: Message routed to:  Adult Clinics: Neurology p [phone]

## 2018-02-07 ENCOUNTER — TELEPHONE (OUTPATIENT)
Dept: FAMILY MEDICINE | Facility: CLINIC | Age: 83
End: 2018-02-07

## 2018-02-07 NOTE — TELEPHONE ENCOUNTER
Neurology referral printed and faxed to [facility] at [phone].  Patient called and informed. Selina Alfonso,

## 2018-02-07 NOTE — TELEPHONE ENCOUNTER
Reason for call: referral  Patient called regarding (reason for call): Patient is asking for a referral to [facility]. He can get in 2/21/2018 and the [facility] did not have an appointment till   3/21/2018. He wants to be seen the soonest. He would a call after it has been completed.   Additional comments: The # to fax to them is [phone]    Phone number to reach patient: [phone]  Best Time:  Anytime  Can we leave a detailed message on this number?  YES

## 2018-02-21 ENCOUNTER — TRANSFERRED RECORDS (OUTPATIENT)
Dept: HEALTH INFORMATION MANAGEMENT | Facility: CLINIC | Age: 83
End: 2018-02-21

## 2018-02-22 ENCOUNTER — TRANSFERRED RECORDS (OUTPATIENT)
Dept: HEALTH INFORMATION MANAGEMENT | Facility: CLINIC | Age: 83
End: 2018-02-22

## 2018-02-26 ENCOUNTER — TRANSFERRED RECORDS (OUTPATIENT)
Dept: HEALTH INFORMATION MANAGEMENT | Facility: CLINIC | Age: 83
End: 2018-02-26

## 2018-03-04 ENCOUNTER — HEALTH MAINTENANCE LETTER (OUTPATIENT)
Age: 83
End: 2018-03-04

## 2018-04-02 ENCOUNTER — TRANSFERRED RECORDS (OUTPATIENT)
Dept: HEALTH INFORMATION MANAGEMENT | Facility: CLINIC | Age: 83
End: 2018-04-02

## 2018-06-25 ENCOUNTER — OFFICE VISIT (OUTPATIENT)
Dept: SURGERY | Facility: CLINIC | Age: 83
End: 2018-06-25
Payer: COMMERCIAL

## 2018-06-25 VITALS
SYSTOLIC BLOOD PRESSURE: 132 MMHG | TEMPERATURE: 97.7 F | DIASTOLIC BLOOD PRESSURE: 72 MMHG | RESPIRATION RATE: 20 BRPM | OXYGEN SATURATION: 95 % | HEART RATE: 84 BPM | BODY MASS INDEX: 23.78 KG/M2 | WEIGHT: 161 LBS

## 2018-06-25 DIAGNOSIS — R10.31 RIGHT INGUINAL PAIN: Primary | ICD-10-CM

## 2018-06-25 PROCEDURE — 99214 OFFICE O/P EST MOD 30 MIN: CPT | Performed by: SURGERY

## 2018-06-25 NOTE — PROGRESS NOTES
Patient seen for right inguinal pain, hernia concern    HPI:  Patient is a 82 year old male  with complaints of recurrent right inguinal hernia  The patient noticed the symptoms shortly after surgery 2 years ago.    Started as some twinges then has been getting worse over time  nothing makes the episode better.  Holding pressure over the area takes the edge off a bit  Wanting to do more activities but is limited by this pain  Patient has  family history of hernia problems in father  Had open right inguinal hernia with me 2 years ago- felt good at post op appt    Review Of Systems    Skin: negative  Ears/Nose/Throat: negative  Respiratory: No shortness of breath, dyspnea on exertion, cough, or hemoptysis and did restart smoking cigars every few days  Cardiovascular: history of mi, afib on coumadin  Gastrointestinal: negative  Genitourinary: negative  Musculoskeletal: as above  Neurologic: neuropathy  Hematologic/Lymphatic/Immunologic: negative  Endocrine: negative      Past Medical History:   Diagnosis Date     AMI (acute myocardial infarction)     X2     Arthritis      Atrial fibrillation (H)      CABG (coronary artery bypass graft) 1983, 1998    first a 5 v cabg, then a 3v cabg     CHF (congestive heart failure) (H)      Hyperlipidemia LDL goal <100      Hypertension goal BP (blood pressure) < 140/90      Moderate major depression (H)      Pacemaker 05/2011       Past Surgical History:   Procedure Laterality Date     CARPAL TUNNEL RELEASE RT/LT Left 12/06/2017    Rogelio Castillo MD at [facility] (Left CTR revision)     CATHETER, ABLATION  July 2012    for atrial fibrillation      CORONARY ARTERY BYPASS  1983 (5v)  -  1998 (3v)    cabg @ [facility] and a redo     IMPLANT PACEMAKER  6/2012     Laminectomy - T11 to L5  aug 26th, 2015    [place]     STENT, CORONARY, TREE  6/3/2011    stenting of the LAD by the mammary artery graft in 2011     TONSILLECTOMY         Social History     Social History     Marital  status:      Spouse name: Maureen     Number of children: 3     Years of education: 15     Occupational History     Computer programer Retired     1999     Social History Main Topics     Smoking status: Former Smoker     Types: Pipe, Cigars     Start date: 1/1/1947     Quit date: 8/18/2015     Smokeless tobacco: Never Used     Alcohol use Yes      Comment: 0-1 drinks of brigitte in evening     Drug use: No     Sexual activity: Not on file     Other Topics Concern     Not on file     Social History Narrative       Current Outpatient Prescriptions   Medication Sig Dispense Refill     amiodarone (PACERONE/CODARONE) 200 MG tablet Take 100 mg by mouth daily        aspirin 81 MG tablet Take 1 tablet by mouth daily.       carvedilol (COREG) 12.5 MG tablet Take 1 tablet (12.5 mg) by mouth 2 times daily (with meals) 60 tablet 2     Cholecalciferol (VITAMIN D3) 2000 UNITS CAPS 1 daily       Cyanocobalamin (B-12) 2500 MCG TABS 1 daily       Ferrous Sulfate (IRON) 325 (65 FE) MG tablet Take 1 tablet by mouth daily       fish oil-omega-3 fatty acids (FISH OIL) 1000 MG capsule Take 2 g by mouth daily       furosemide (LASIX) 80 MG tablet Take 1 tablet (80 mg) by mouth daily 90 tablet 1     levothyroxine (SYNTHROID/LEVOTHROID) 75 MCG tablet TAKE 1 TABLET BY MOUTH ONCE DAILY 90 tablet 2     lisinopril (PRINIVIL/ZESTRIL) 2.5 MG tablet TAKE 1 TABLET (2.5 MG) BY MOUTH DAILY 90 tablet 2     Multiple vitamin CAPS 1 daily       Nitroglycerin (NITROSTAT SL) Place 0.4 mg under the tongue       potassium chloride SA (K-DUR,KLOR-CON M) 20 MEQ tablet Take 1 tablet by mouth daily       warfarin (COUMADIN) 5 MG tablet Take 1/2 tablet (2.5 mg ) daily or as directed by ACC nurse 90 tablet 1     MAGNESIUM OXIDE PO          Medications and history reviewed    Physical exam:  Vitals: /72  Pulse 84  Temp 97.7  F (36.5  C) (Oral)  Resp 20  Wt 73 kg (161 lb)  SpO2 95%  BMI 23.78 kg/m2  BMI= Body mass index is 23.78  kg/(m^2).    Constitutional: healthy, alert and no distress  Head: Normocephalic. No masses, lesions, tenderness or abnormalities  Cardiovascular: negative, PMI normal. No lifts, heaves, or thrills. RRR. No murmurs, clicks gallops or rub  Respiratory: positive findings: wheezing R upper posterior  Gastrointestinal: Abdomen soft, non-tender. BS normal. No masses, organomegaly  : Normal external genitalia without lesions and male positive for tenderness in right testicle, no mass felt. No palpable hernia or lump, no impulse with cough. No change when supine vs standing. Right inguinal scar well healed  Musculoskeletal: extremities normal- no gross deformities noted and shuffling gait, uses walker  Skin: no suspicious lesions or rashes  Psychiatric: mentation appears normal and affect normal/bright  Hematologic/Lymphatic/Immunologic: Normal cervical lymph nodes  Patient able to get up on table with moderate difficulty.      Assessment:     ICD-10-CM    1. Right inguinal pain R10.31 CT Abdomen Pelvis w/o & w Contrast     Plan: No hernia recurrence palpated. Area of inguinal pain different from the testicular tenderness noted with exam. Will check CT to see if any abnormality able to be seen. Will inform of results once back    Luis Ibrahim MD

## 2018-06-25 NOTE — LETTER
6/25/2018         RE: Juan Antonio Sierra  [address]  [place] [address]        Dear Colleague,    Thank you for referring your patient, Juan Antonio Sierra, to the [facility]. Please see a copy of my visit note below.    Patient seen for right inguinal pain, hernia concern    HPI:  Patient is a 82 year old male  with complaints of recurrent right inguinal hernia  The patient noticed the symptoms shortly after surgery 2 years ago.    Started as some twinges then has been getting worse over time  nothing makes the episode better.  Holding pressure over the area takes the edge off a bit  Wanting to do more activities but is limited by this pain  Patient has  family history of hernia problems in father  Had open right inguinal hernia with me 2 years ago- felt good at post op appt    Review Of Systems    Skin: negative  Ears/Nose/Throat: negative  Respiratory: No shortness of breath, dyspnea on exertion, cough, or hemoptysis and did restart smoking cigars every few days  Cardiovascular: history of mi, afib on coumadin  Gastrointestinal: negative  Genitourinary: negative  Musculoskeletal: as above  Neurologic: neuropathy  Hematologic/Lymphatic/Immunologic: negative  Endocrine: negative      Past Medical History:   Diagnosis Date     AMI (acute myocardial infarction)     X2     Arthritis      Atrial fibrillation (H)      CABG (coronary artery bypass graft) 1983, 1998    first a 5 v cabg, then a 3v cabg     CHF (congestive heart failure) (H)      Hyperlipidemia LDL goal <100      Hypertension goal BP (blood pressure) < 140/90      Moderate major depression (H)      Pacemaker 05/2011       Past Surgical History:   Procedure Laterality Date     CARPAL TUNNEL RELEASE RT/LT Left 12/06/2017    Rogelio Castillo MD at [facility] (Left CTR revision)     CATHETER, ABLATION  July 2012    for atrial fibrillation      CORONARY ARTERY BYPASS  1983 (5v)  -  1998 (3v)    cabg @ [facility] and [place]  redo     IMPLANT PACEMAKER  6/2012     Laminectomy - T11 to L5  aug 26th, 2015    [place]     STENT, CORONARY, TREE  6/3/2011    stenting of the LAD by the mammary artery graft in 2011     TONSILLECTOMY         Social History     Social History     Marital status:      Spouse name: Maureen     Number of children: 3     Years of education: 15     Occupational History     Computer programer Retired     1999     Social History Main Topics     Smoking status: Former Smoker     Types: Pipe, Cigars     Start date: 1/1/1947     Quit date: 8/18/2015     Smokeless tobacco: Never Used     Alcohol use Yes      Comment: 0-1 drinks of brigitte in evening     Drug use: No     Sexual activity: Not on file     Other Topics Concern     Not on file     Social History Narrative       Current Outpatient Prescriptions   Medication Sig Dispense Refill     amiodarone (PACERONE/CODARONE) 200 MG tablet Take 100 mg by mouth daily        aspirin 81 MG tablet Take 1 tablet by mouth daily.       carvedilol (COREG) 12.5 MG tablet Take 1 tablet (12.5 mg) by mouth 2 times daily (with meals) 60 tablet 2     Cholecalciferol (VITAMIN D3) 2000 UNITS CAPS 1 daily       Cyanocobalamin (B-12) 2500 MCG TABS 1 daily       Ferrous Sulfate (IRON) 325 (65 FE) MG tablet Take 1 tablet by mouth daily       fish oil-omega-3 fatty acids (FISH OIL) 1000 MG capsule Take 2 g by mouth daily       furosemide (LASIX) 80 MG tablet Take 1 tablet (80 mg) by mouth daily 90 tablet 1     levothyroxine (SYNTHROID/LEVOTHROID) 75 MCG tablet TAKE 1 TABLET BY MOUTH ONCE DAILY 90 tablet 2     lisinopril (PRINIVIL/ZESTRIL) 2.5 MG tablet TAKE 1 TABLET (2.5 MG) BY MOUTH DAILY 90 tablet 2     Multiple vitamin CAPS 1 daily       Nitroglycerin (NITROSTAT SL) Place 0.4 mg under the tongue       potassium chloride SA (K-DUR,KLOR-CON M) 20 MEQ tablet Take 1 tablet by mouth daily       warfarin (COUMADIN) 5 MG tablet Take 1/2 tablet (2.5 mg ) daily or as directed by ACC nurse 90 tablet 1      MAGNESIUM OXIDE PO          Medications and history reviewed    Physical exam:  Vitals: /72  Pulse 84  Temp 97.7  F (36.5  C) (Oral)  Resp 20  Wt 73 kg (161 lb)  SpO2 95%  BMI 23.78 kg/m2  BMI= Body mass index is 23.78 kg/(m^2).    Constitutional: healthy, alert and no distress  Head: Normocephalic. No masses, lesions, tenderness or abnormalities  Cardiovascular: negative, PMI normal. No lifts, heaves, or thrills. RRR. No murmurs, clicks gallops or rub  Respiratory: positive findings: wheezing R upper posterior  Gastrointestinal: Abdomen soft, non-tender. BS normal. No masses, organomegaly  : Normal external genitalia without lesions and male positive for tenderness in right testicle, no mass felt. No palpable hernia or lump, no impulse with cough. No change when supine vs standing. Right inguinal scar well healed  Musculoskeletal: extremities normal- no gross deformities noted and shuffling gait, uses walker  Skin: no suspicious lesions or rashes  Psychiatric: mentation appears normal and affect normal/bright  Hematologic/Lymphatic/Immunologic: Normal cervical lymph nodes  Patient able to get up on table with moderate difficulty.      Assessment:     ICD-10-CM    1. Right inguinal pain R10.31 CT Abdomen Pelvis w/o & w Contrast     Plan: No hernia recurrence palpated. Area of inguinal pain different from the testicular tenderness noted with exam. Will check CT to see if any abnormality able to be seen. Will inform of results once back    Luis Ibrahim MD      Again, thank you for allowing me to participate in the care of your patient.        Sincerely,        Luis Ibrahim MD

## 2018-06-25 NOTE — MR AVS SNAPSHOT
After Visit Summary   6/25/2018    Juan Antonio Sierra    MRN: [id number]           Patient Information     Date Of Birth          1935        Visit Information        Provider Department      6/25/2018 1:00 PM Luis Ibrahim MD [facility]        Today's Diagnoses     Right inguinal pain    -  1       Follow-ups after your visit        Future tests that were ordered for you today     Open Future Orders        Priority Expected Expires Ordered    CT Abdomen Pelvis w/o & w Contrast Routine  6/25/2019 6/25/2018            Who to contact     If you have questions or need follow up information about today's clinic visit or your schedule please contact [facility] directly at [phone].  Normal or non-critical lab and imaging results will be communicated to you by [employer], letter or phone within 4 business days after the clinic has received the results. If you do not hear from us within 7 days, please contact the clinic through [employer] or phone. If you have a critical or abnormal lab result, we will notify you by phone as soon as possible.  Submit refill requests through [employer] or call your pharmacy and they will forward the refill request to us. Please allow 3 business days for your refill to be completed.          Additional Information About Your Visit        MyChart Information     [employer] gives you secure access to your electronic health record. If you see a primary care provider, you can also send messages to your care team and make appointments. If you have questions, please call your primary care clinic.  If you do not have a primary care provider, please call [phone] and they will assist you.        Care EveryWhere ID     This is your Care EveryWhere ID. This could be used by other organizations to access your [place] medical records  [id number]        Your Vitals Were     Pulse Temperature Respirations Pulse Oximetry BMI (Body Mass Index)        84 97.7  F (36.5  C) (Oral) 20 95% 23.78 kg/m2        Blood Pressure from Last 3 Encounters:   06/25/18 132/72   11/28/17 100/58   07/20/17 112/80    Weight from Last 3 Encounters:   06/25/18 73 kg (161 lb)   12/19/17 72.6 kg (160 lb)   11/28/17 74.8 kg (165 lb)               Primary Care Provider Office Phone # Fax #    Gianni Wright [phone]       [address] [facility] [address]        Equal Access to Services     [facility]: Hadii aad ku hadasho Soomaali, waaxda luqadaha, qaybta kaalmada adeegyada, [address] ward . So [facility] [place] [phone].    ATENCIÓN: Si habla español, tiene a ness disposición servicios gratuitos de asistencia lingüística. [facility] [phone].    We comply with applicable federal civil rights laws and [place] laws. We do not discriminate on the basis of race, color, national origin, age, disability, sex, sexual orientation, or gender identity.            Thank you!     Thank you for choosing [facility]  for your care. Our goal is always to provide you with excellent care. Hearing back from our patients is one way we can continue to improve our services. Please take a few minutes to complete the written survey that you may receive in the mail after your visit with us. Thank you!             Your Updated Medication List - Protect others around you: Learn how to safely use, store and throw away your medicines at www.disposemymeds.org.          This list is accurate as of 6/25/18  1:12 PM.  Always use your most recent med list.                   Brand Name Dispense Instructions for use Diagnosis    amiodarone 200 MG tablet    PACERONE/CODARONE     Take 100 mg by mouth daily        aspirin 81 MG tablet      Take 1 tablet by mouth daily.        B-12 2500 MCG Tabs      1 daily        carvedilol 12.5 MG tablet    COREG    60 tablet    Take 1 tablet (12.5 mg) by mouth 2 times daily (with meals)    Chronic congestive heart  failure, unspecified congestive heart failure type (H)       fish oil-omega-3 fatty acids 1000 MG capsule      Take 2 g by mouth daily        furosemide 80 MG tablet    LASIX    90 tablet    Take 1 tablet (80 mg) by mouth daily    Congestive heart failure, unspecified congestive heart failure chronicity, unspecified congestive heart failure type (H), Hypertension goal BP (blood pressure) < 140/90       iron 325 (65 Fe) MG tablet      Take 1 tablet by mouth daily        levothyroxine 75 MCG tablet    SYNTHROID/LEVOTHROID    90 tablet    TAKE 1 TABLET BY MOUTH ONCE DAILY    Hypothyroidism, unspecified type       lisinopril 2.5 MG tablet    PRINIVIL/Zestril    90 tablet    TAKE 1 TABLET (2.5 MG) BY MOUTH DAILY    Acute ST elevation myocardial infarction (STEMI), unspecified artery (H)       MAGNESIUM OXIDE PO           Multiple vitamin Caps      1 daily        NITROSTAT SL      Place 0.4 mg under the tongue        potassium chloride SA 20 MEQ CR tablet    K-DUR/KLOR-CON M     Take 1 tablet by mouth daily        vitamin D3 2000 units Caps      1 daily        warfarin 5 MG tablet    COUMADIN    90 tablet    Take 1/2 tablet (2.5 mg ) daily or as directed by ACC nurse    Atrial fibrillation (H)

## 2018-07-02 ENCOUNTER — RADIANT APPOINTMENT (OUTPATIENT)
Dept: CT IMAGING | Facility: CLINIC | Age: 83
End: 2018-07-02
Attending: SURGERY
Payer: COMMERCIAL

## 2018-07-02 DIAGNOSIS — R10.31 RIGHT INGUINAL PAIN: ICD-10-CM

## 2018-07-02 LAB
CREAT BLD-MCNC: 0.9 MG/DL (ref 0.66–1.25)
GFR SERPL CREATININE-BSD FRML MDRD: 81 ML/MIN/1.7M2

## 2018-07-02 PROCEDURE — 82565 ASSAY OF CREATININE: CPT | Performed by: RADIOLOGY

## 2018-07-02 PROCEDURE — 74177 CT ABD & PELVIS W/CONTRAST: CPT | Performed by: RADIOLOGY

## 2018-07-02 RX ORDER — IOPAMIDOL 755 MG/ML
99 INJECTION, SOLUTION INTRAVASCULAR ONCE
Status: COMPLETED | OUTPATIENT
Start: 2018-07-02 | End: 2018-07-02

## 2018-07-02 RX ADMIN — IOPAMIDOL 99 ML: 755 INJECTION, SOLUTION INTRAVASCULAR at 13:54

## 2018-07-05 ENCOUNTER — TELEPHONE (OUTPATIENT)
Dept: SURGERY | Facility: CLINIC | Age: 83
End: 2018-07-05

## 2018-07-05 NOTE — TELEPHONE ENCOUNTER
"Attempted to reach pt however he was unavailable.  Will try calling back at another time to inform of MD's message below:    \"Let him know no recurrent hernia or other pathology there to explain pain, maybe muscle strain   Rec ice, rest/no heavy lifting, ibuprofen and see if improves with time   MyChart message sent as well \"    Dillan Corrigan RN....7/5/2018 8:30 AM     "

## 2018-07-06 NOTE — TELEPHONE ENCOUNTER
Called pt again and notified of MD's result note.  Pt verbalized understanding, has no further questions.    Dillan Corrigan RN....7/6/2018 8:34 AM

## 2018-07-16 ENCOUNTER — TELEPHONE (OUTPATIENT)
Dept: INTERNAL MEDICINE | Facility: CLINIC | Age: 83
End: 2018-07-16

## 2018-07-16 NOTE — TELEPHONE ENCOUNTER
Reason for Call:  To discuss getting vitamin E testing done    Detailed comments: Please call to discuss testing for his neuropathy.     Phone Number Patient can be reached at: Home number on file [phone] (home)    Best Time: anytime    Can we leave a detailed message on this number? YES    Call taken on 7/16/2018 at 3:07 PM by Debbie Roa

## 2018-07-17 NOTE — TELEPHONE ENCOUNTER
Called pt and Wife answered. No consent to communicate on file. Asked that pt call back to clarify his request. Wife will have pt call back.    Fariha Jackman RN  [facility]

## 2018-07-17 NOTE — TELEPHONE ENCOUNTER
Patient returning the call. Read prior message to patient to make appointment. He hung up.  No appointment made.

## 2018-07-17 NOTE — TELEPHONE ENCOUNTER
Pt called back. Wants a test for vitamin E. States it is something he looked up on [employer]. It says one of the key  Ingredients for neuropathy and the only way to check it is a fasting test. Please advise if ok to order a vitamin E lab.    Fariha Jackman RN  [facility] [place]

## 2018-07-23 NOTE — PROGRESS NOTES
SUBJECTIVE:   Juan Antonio Sierra is a 82 year old male who presents to clinic today for the following health issues:    Patient presents with:  Blood Draw: vitamin E testing   Health Maintenance: copd, dap, tetanus, inr referral, adv directives, colonoscopy, phq9, bmp, lipid, fall risk     Mr. Sierra is here today to follow-up for his chronic refractory neuropathy and have some vitamin E testing done at his request, he read some articles from [facility] and wants this test done. He had a carpal tunnel release surgery done last fall, and his symptoms have not improved at all. He is certain now that the symptoms he was experiencing were from his peripheral neuropathy, not from the carpal tunnel. He is now reporting neuropathy type symptoms all across his body. Feels like he has no sense of anything in his hands, could drop something and not realize it. The lack of feeling in some areas is so severe he feels someone could cut one of his toes off with a pliers and he wouldn't feel it. He believes that these problems originally started after his back operation at the [facility]. His last appointment with us was a pre-op in November prior to his  He denies excessive alcohol use, maybe a small glass of whiskey a day. He does have a smoking history, but is currently only using cigars. He has some worries about being able to continue to care for his wife who relies on him. He believes that some of his neuropathy may be caused by a Vitamin E deficiency, which is why he wants to have it checked today.       Problem list and histories reviewed & adjusted, as indicated.  Additional history: as documented    Patient Active Problem List   Diagnosis     Atrial fibrillation (H)     AMI (acute myocardial infarction)     Arthritis     Hyperlipidemia LDL goal <100     Hypertension goal BP (blood pressure) < 140/90     S/P CABG (coronary artery bypass graft)     Health Care Home     Transient cerebral ischemia     Ex-smoker      Generalized weakness     Paraneoplastic neuropathy (H)     Pulmonary nodule     High risk medications (not anticoagulants) long-term use     Congestive heart failure, unspecified congestive heart failure chronicity, unspecified congestive heart failure type (H)     Major depressive disorder, recurrent episode, moderate (H)     Other emphysema (H)     Ischemic cardiomyopathy     CKD (chronic kidney disease) stage 3, GFR 30-59 ml/min     IPF (idiopathic pulmonary fibrosis) (H)     Acquired hypothyroidism     Bilateral carpal tunnel syndrome     Past Surgical History:   Procedure Laterality Date     CARPAL TUNNEL RELEASE RT/LT Left 12/06/2017    Rogelio Castillo MD at [facility] (Left CTR revision)     CATHETER, ABLATION  July 2012    for atrial fibrillation      CORONARY ARTERY BYPASS  1983 (5v)  -  1998 (3v)    cabg @ [facility] and a redo     IMPLANT PACEMAKER  6/2012     Laminectomy - T11 to L5  aug 26th, 2015    [place]     STENT, CORONARY, TREE  6/3/2011    stenting of the LAD by the mammary artery graft in 2011     TONSILLECTOMY         Social History   Substance Use Topics     Smoking status: Current Some Day Smoker     Types: Cigars     Start date: 1[birth year]1/1947     Last attempt to quit: 8/18/2015     Smokeless tobacco: Never Used      Comment: restarted smoking cigars     Alcohol use Yes      Comment: 0-1 drinks of brigitte in evening     Family History   Problem Relation Age of Onset     Respiratory Mother      chf     Cerebrovascular Disease Mother 86     HEART DISEASE Father      Neurologic Disorder Sister      migraines/ unknown age         BP Readings from Last 3 Encounters:   07/24/18 128/76   06/25/18 132/72   11/28/17 100/58    Wt Readings from Last 3 Encounters:   07/24/18 70.9 kg (156 lb 6.4 oz)   06/25/18 73 kg (161 lb)   12/19/17 72.6 kg (160 lb)           Reviewed and updated as needed this visit by clinical staff       Reviewed and updated as needed this visit by Provider      "    ROS:  Constitutional, HEENT, cardiovascular, pulmonary, GI, , musculoskeletal, neuro, skin, endocrine and psych systems are negative, except as otherwise noted.    This document serves as a record of the services and decisions personally performed and made by Gianni Wright MD. It was created on his behalf by Edith Cano, a trained medical scribe. The creation of this document is based on the provider's statements to the medical scribe.  Edith Cano July 24, 2018 11:21 AM      OBJECTIVE:     /76  Pulse 88  Temp 97.8  F (36.6  C) (Oral)  Resp 22  Ht 1.753 m (5' 9\")  Wt 70.9 kg (156 lb 6.4 oz)  SpO2 97%  BMI 23.1 kg/m2  Body mass index is 23.1 kg/(m^2).  GENERAL: healthy, alert and no distress  EYES: Eyes grossly normal to inspection, PERRL and conjunctivae and sclerae normal  RESP: lungs clear to auscultation - no rales, rhonchi or wheezes  CV: regular rate and rhythm, normal S1 S2, no S3 or S4, no murmur, click or rub, no peripheral edema and peripheral pulses strong  MS: no gross musculoskeletal defects noted, no edema  SKIN: no suspicious lesions or rashes to visible skin  NEURO: Normal strength and tone, mentation intact and speech normal  PSYCH: mentation appears normal, affect normal/bright    Diagnostic Test Results:  No results found for this or any previous visit (from the past 24 hour(s)).    ASSESSMENT/PLAN:     (D49.9,  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)  (primary encounter diagnosis)  Comment: a patient with a refractory chronic polyneuropathy of uncertain cause. This is well documented history. Further workup is fine to pursue though the likelihood of finding a correctable causes of this disease seems slim  Plan: Vitamin E, TSH with free T4 reflex, CBC with         platelets differential, Vitamin B12            (Z12.11) Screen for colon cancer  Comment: declines   Plan: PAF COMPLETED            (Z23) Need for prophylactic vaccination with tetanus-diphtheria (TD)  Comment: administered "   Plan: PAF COMPLETED, TDAP, IM (10 - 64 YRS) - Adacel            (E03.9) Acquired hypothyroidism  Comment: recheck TSH ( thyroid test )   Plan: as above     (N18.3) CKD (chronic kidney disease) stage 3, GFR 30-59 ml/min  Comment: recheck   Plan: BASIC METABOLIC PANEL            (J43.8) Other emphysema (H)  Comment: patient in a stable phase of chronic illness. See chronic obstructive pulmonary disease action plan in letter section   Plan: as above     (I25.5) Ischemic cardiomyopathy  Comment: follows with cardiologist with [facility], operated by [facility]   Plan: see care everywhere    (I50.9) Congestive heart failure, unspecified congestive heart failure chronicity, unspecified congestive heart failure type (H)  Comment: as above   Plan: we corrected some things with his medication administration record     (E78.5) Hyperlipidemia LDL goal <100  Comment: recheck   Plan: Lipid panel reflex to direct LDL Fasting                The information in this document, created by the medical scribe for me, accurately reflects the services I personally performed and the decisions made by me. I have reviewed and approved this document for accuracy.   MD Gianni Crook MD  [facility]

## 2018-07-24 ENCOUNTER — OFFICE VISIT (OUTPATIENT)
Dept: FAMILY MEDICINE | Facility: CLINIC | Age: 83
End: 2018-07-24
Payer: COMMERCIAL

## 2018-07-24 VITALS
DIASTOLIC BLOOD PRESSURE: 76 MMHG | HEART RATE: 88 BPM | OXYGEN SATURATION: 97 % | WEIGHT: 156.4 LBS | SYSTOLIC BLOOD PRESSURE: 128 MMHG | TEMPERATURE: 97.8 F | RESPIRATION RATE: 22 BRPM | BODY MASS INDEX: 23.16 KG/M2 | HEIGHT: 69 IN

## 2018-07-24 DIAGNOSIS — G13.0 PARANEOPLASTIC NEUROPATHY (H): Primary | ICD-10-CM

## 2018-07-24 DIAGNOSIS — E03.9 ACQUIRED HYPOTHYROIDISM: ICD-10-CM

## 2018-07-24 DIAGNOSIS — I50.9 CHRONIC CONGESTIVE HEART FAILURE, UNSPECIFIED CONGESTIVE HEART FAILURE TYPE: ICD-10-CM

## 2018-07-24 DIAGNOSIS — E78.5 HYPERLIPIDEMIA LDL GOAL <100: ICD-10-CM

## 2018-07-24 DIAGNOSIS — Z12.11 SCREEN FOR COLON CANCER: ICD-10-CM

## 2018-07-24 DIAGNOSIS — Z23 NEED FOR PROPHYLACTIC VACCINATION WITH TETANUS-DIPHTHERIA (TD): ICD-10-CM

## 2018-07-24 DIAGNOSIS — I25.5 ISCHEMIC CARDIOMYOPATHY: ICD-10-CM

## 2018-07-24 DIAGNOSIS — D49.9 PARANEOPLASTIC NEUROPATHY (H): Primary | ICD-10-CM

## 2018-07-24 DIAGNOSIS — I50.9 CONGESTIVE HEART FAILURE, UNSPECIFIED CONGESTIVE HEART FAILURE CHRONICITY, UNSPECIFIED CONGESTIVE HEART FAILURE TYPE: ICD-10-CM

## 2018-07-24 DIAGNOSIS — N18.30 CKD (CHRONIC KIDNEY DISEASE) STAGE 3, GFR 30-59 ML/MIN (H): ICD-10-CM

## 2018-07-24 DIAGNOSIS — J43.8 OTHER EMPHYSEMA (H): ICD-10-CM

## 2018-07-24 PROCEDURE — 90471 IMMUNIZATION ADMIN: CPT | Performed by: INTERNAL MEDICINE

## 2018-07-24 PROCEDURE — 90715 TDAP VACCINE 7 YRS/> IM: CPT | Performed by: INTERNAL MEDICINE

## 2018-07-24 PROCEDURE — 99214 OFFICE O/P EST MOD 30 MIN: CPT | Mod: 25 | Performed by: INTERNAL MEDICINE

## 2018-07-24 PROCEDURE — 99207 C PAF COMPLETED  NO CHARGE: CPT | Performed by: INTERNAL MEDICINE

## 2018-07-24 RX ORDER — CARVEDILOL 12.5 MG/1
TABLET ORAL
Qty: 1 TABLET | Refills: 0
Start: 2018-07-24

## 2018-07-24 RX ORDER — AMIODARONE HYDROCHLORIDE 200 MG/1
TABLET ORAL
Qty: 1 TABLET | Refills: 0
Start: 2018-07-24

## 2018-07-24 NOTE — LETTER
My COPD Action Plan     Name: Juan Antonio Sierra    YOB: 1935   Date: 7/24/2018    My doctor: Gianni Wright MD   My clinic: [address]  [place] [id number] [phone]  My Controller Medicine: none   Dose:      My Rescue Medicine: none   Dose:      My Flare Up Medicine: none   Dose:  FEV-1 (no units)   Date Value   09/04/2014 52     FEV1/FVC (no units)   Date Value   01/03/2013 86      My COPD Severity: Mild = FeV1 > 80%      Use of Oxygen: Oxygen Not Prescribed      Make sure you've had your pneumonia   vaccines.          GREEN ZONE       Doing well today      Usual level of activity and exercise    Usual amount of cough and mucus    No shortness of breath    Usual level of health (thinking clearly, sleeping well, feel like eating) Actions:      Take daily medicines    Use oxygen as prescribed    Follow regular exercise and diet plan    Avoid cigarette smoke and other irritants that harm the lungs           YELLOW ZONE          Having a bad day or flare up      Short of breath more than usual    A lot more sputum (mucus) than usual    Sputum looks yellow, green, tan, brown or bloody    More coughing or wheezing    Fever or chills    Less energy; trouble completing activities    Trouble thinking or focusing    Using quick relief inhaler or nebulizer more often    Poor sleep; symptoms wake me up    Do not feel like eating Actions:      Get plenty of rest    Take daily medicines    Use quick relief inhaler every  hours    If you use oxygen, call you doctor to see if you should adjust your oxygen    Do breathing exercises or other things to help you relax    Let a loved one, friend or neighbor know you are feeling worse    Call your care team if you have 2 or more symptoms.  Start taking steroids or antibiotics if directed by your care team           RED ZONE       Need medical care now      Severe shortness of breath (feel you can't breathe)    Fever,  chills    Not enough breath to do any activity    Trouble coughing up mucus, walking or talking    Blood in mucus    Frequent coughing   Rescue medicines are not working    Not able to sleep because of breathing    Feel confused or drowsy    Chest pain    Actions:      Call your health care team.  If you cannot reach your care team, call 911 or go to the emergency room.        Annual Reminders:  Meet with Care Team, Flu Shot every Fall  Pharmacy:    [id number] - [place] - [address] [address]

## 2018-07-24 NOTE — PATIENT INSTRUCTIONS
[facility]    If you have any questions regarding to your visit please contact your care team:     Team Pink:   Clinic Hours Telephone Number   Internal Medicine:  Dr. Raina Tran NP       7am-7pm  Monday - Thursday   7am-5pm  Fridays  [phone]  (Appointment scheduling available 24/7)    Questions about your recent visit?  Team Line  [phone]   Urgent Care - [place] and [facility] - 11am-9pm Monday-Friday Saturday-Sunday- 9am-5pm   [place] - 5pm-9pm Monday-Friday Saturday-Sunday- 9am-5pm  [phone] - [place]  [phone] - [place]       What options do I have for a visit other than an office visit? We offer electronic visits (e-visits) and telephone visits, when medically appropriate.  Please check with your medical insurance to see if these types of visits are covered, as you will be responsible for any charges that are not paid by your insurance.      You can use [employer] (secure electronic communication) to access to your chart, send your primary care provider a message, or make an appointment. Ask a team member how to get started.     For a price quote for your services, please call our Consumer Price Line at [phone] or our Imaging Cost estimation line at [phone] (for imaging tests).  Discharge HENRIQUE Hogan CMA      At your visit today, we discussed your risk for falls and preventive options.    Fall Prevention  Falls often occur due to slipping, tripping or losing your balance. Millions of people fall every year and injure themselves. Here are ways to reduce your risk of falling again.    Think about your fall, was there anything that caused your fall that can be fixed, removed, or replaced?    Make your home safe by keeping walkways clear of objects you may trip over.    Use non-slip pads under rugs. Do not use area rugs or small throw rugs.    Use non-slip mats in bathtubs and showers.    Install  handrails and lights on staircases.    Do not walk in poorly lit areas.    Do not stand on chairs or wobbly ladders.    Use caution when reaching overhead or looking upward. This position can cause a loss of balance.    Be sure your shoes fit properly, have non-slip bottoms and are in good condition.     Wear shoes both inside and out. Avoid going barefoot or wearing slippers.    Be cautious when going up and down stairs, curbs, and when walking on uneven sidewalks.    If your balance is poor, consider using a cane or walker.    If your fall was related to alcohol use, stop or limit alcohol intake.     If your fall was related to use of sleeping medicines, talk to your doctor about this. You may need to reduce your dosage at bedtime if you awaken during the night to go to the bathroom.      To reduce the need for nighttime bathroom trips:  ? Avoid drinking fluids for several hours before going to bed  ? Empty your bladder before going to bed  ? Men can keep a urinal at the bedside    Stay as active as you can. Balance, flexibility, strength, and endurance all come from exercise. They all play a role in preventing falls. Ask your healthcare provider which types of activity are right for you.    Get your vision checked on a regular basis.    If you have pets, know where they are before you stand up or walk so you don't trip over them.    Use night lights.  Date Last Reviewed: 11/5/2015 2000-2017 [employer]. [address] [place] [address], [place] [address]. All rights reserved. This information is not intended as a substitute for professional medical care. Always follow your healthcare professional's instructions.

## 2018-07-24 NOTE — NURSING NOTE
Screening Questionnaire for Adult Immunization    Are you sick today?   No   Do you have allergies to medications, food, a vaccine component or latex?   Yes   Have you ever had a serious reaction after receiving a vaccination?   No   Do you have a long-term health problem with heart disease, lung disease, asthma, kidney disease, metabolic disease (e.g. diabetes), anemia, or other blood disorder?   No   Do you have cancer, leukemia, HIV/AIDS, or any other immune system problem?   No   In the past 3 months, have you taken medications that affect  your immune system, such as prednisone, other steroids, or anticancer drugs; drugs for the treatment of rheumatoid arthritis, Crohn s disease, or psoriasis; or have you had radiation treatments?   No   Have you had a seizure, or a brain or other nervous system problem?   No   During the past year, have you received a transfusion of blood or blood     products, or been given immune (gamma) globulin or antiviral drug?   No   For women: Are you pregnant or is there a chance you could become        pregnant during the next month?   No   Have you received any vaccinations in the past 4 weeks?   No     Immunization questionnaire was positive for at least one answer.  Notified MD.        Per orders of Dr. Wright, injection of Tdap given by Lorenzo Hogan. Patient instructed to remain in clinic for 15 minutes afterwards, and to report any adverse reaction to me immediately.       Screening performed by Lorenzo Hogan on 7/24/2018 at 12:09 PM.

## 2018-07-24 NOTE — MR AVS SNAPSHOT
After Visit Summary   7/24/2018    Juan Antonio Sierra    MRN: [id number]           Patient Information     Date Of Birth          1935        Visit Information        Provider Department      7/24/2018 11:10 AM Gianni Wright MD [facility]        Today's Diagnoses     Paraneoplastic neuropathy (H)    -  1    Screen for colon cancer        Need for prophylactic vaccination with tetanus-diphtheria (TD)        Acquired hypothyroidism        CKD (chronic kidney disease) stage 3, GFR 30-59 ml/min        Other emphysema (H)        Ischemic cardiomyopathy        Congestive heart failure, unspecified congestive heart failure chronicity, unspecified congestive heart failure type (H)        Hyperlipidemia LDL goal <100        Chronic congestive heart failure, unspecified congestive heart failure type (H)          Care Instructions    [facility]    If you have any questions regarding to your visit please contact your care team:     Team Pink:   Clinic Hours Telephone Number   Internal Medicine:  Dr. Raina rTan NP       7am-7pm  Monday - Thursday   7am-5pm  Fridays  [phone]  (Appointment scheduling available 24/7)    Questions about your recent visit?  Team Line  [phone]   Urgent Care - [place] and [place] - 11am-9pm Monday-Friday Saturday-Sunday- 9am-5pm   [place] - 5pm-9pm Monday-Friday Saturday-Sunday- 9am-5pm  [phone] - [place]  [phone] - [place]       What options do I have for a visit other than an office visit? We offer electronic visits (e-visits) and telephone visits, when medically appropriate.  Please check with your medical insurance to see if these types of visits are covered, as you will be responsible for any charges that are not paid by your insurance.      You can use [employer] (secure electronic communication) to access to your chart, send your primary care provider a message, or  "make an appointment. Ask a team member how to get started.     For a price quote for your services, please call our Consumer Price Line at [phone] or our Imaging Cost estimation line at [phone] (for imaging tests).  Discharge HENRIQUE Hogan  [facility]            Follow-ups after your visit        Who to contact     If you have questions or need follow up information about today's clinic visit or your schedule please contact [facility] directly at [phone].  Normal or non-critical lab and imaging results will be communicated to you by [employer], letter or phone within 4 business days after the clinic has received the results. If you do not hear from us within 7 days, please contact the clinic through [employer] or phone. If you have a critical or abnormal lab result, we will notify you by phone as soon as possible.  Submit refill requests through [employer] or call your pharmacy and they will forward the refill request to us. Please allow 3 business days for your refill to be completed.          Additional Information About Your Visit        [employer] Information     [employer] gives you secure access to your electronic health record. If you see a primary care provider, you can also send messages to your care team and make appointments. If you have questions, please call your primary care clinic.  If you do not have a primary care provider, please call [phone] and they will assist you.        Care EveryWhere ID     This is your Care EveryWhere ID. This could be used by other organizations to access your [facility] medical records  [id number]        Your Vitals Were     Pulse Temperature Respirations Height Pulse Oximetry BMI (Body Mass Index)    88 97.8  F (36.6  C) (Oral) 22 5' 9\" (1.753 m) 97% 23.1 kg/m2       Blood Pressure from Last 3 Encounters:   07/24/18 128/76   06/25/18 132/72   11/28/17 100/58    Weight from Last 3 Encounters:   07/24/18 156 lb 6.4 oz (70.9 kg)   06/25/18 161 lb (73 kg) "   12/19/17 160 lb (72.6 kg)              We Performed the Following     BASIC METABOLIC PANEL     CBC with platelets differential     Lipid panel reflex to direct LDL Fasting     PAF COMPLETED     TDAP, IM (10 - 64 YRS) - Adacel     TSH with free T4 reflex     Vitamin B12     Vitamin E          Today's Medication Changes          These changes are accurate as of 7/24/18 12:01 PM.  If you have any questions, ask your nurse or doctor.               These medicines have changed or have updated prescriptions.        Dose/Directions    amiodarone 200 MG tablet   Commonly known as:  PACERONE/CODARONE   This may have changed:    - how much to take  - how to take this  - when to take this  - additional instructions   Used for:  Ischemic cardiomyopathy, Chronic congestive heart failure, unspecified congestive heart failure type (H)   Changed by:  Gianni Wright MD        Take every other day   Quantity:  1 tablet   Refills:  0       carvedilol 12.5 MG tablet   Commonly known as:  COREG   This may have changed:    - how much to take  - how to take this  - when to take this  - additional instructions   Used for:  Chronic congestive heart failure, unspecified congestive heart failure type (H), Ischemic cardiomyopathy   Changed by:  Gianni Wright MD        Take 1/4 tab 2 times a day   Quantity:  1 tablet   Refills:  0            Where to get your medicines      Some of these will need a paper prescription and others can be bought over the counter.  Ask your nurse if you have questions.     You don't need a prescription for these medications     amiodarone 200 MG tablet    carvedilol 12.5 MG tablet                Primary Care Provider Office Phone # Fax #    Gianni Wright [phone]       [address] [facility] [address]        Equal Access to Services     [facility] CARLIN AH: Too Herman, wahelio luqadaha, qaybta kaedgard prajapati . [employer]  [phone].    ATENCIÓN: Si rody swenson, tiene a ness disposición servicios gratuitos de asistencia lingüística. Ruht bryson [phone].    We comply with applicable federal civil rights laws and [place] laws. We do not discriminate on the basis of race, color, national origin, age, disability, sex, sexual orientation, or gender identity.            Thank you!     Thank you for choosing [facility]  for your care. Our goal is always to provide you with excellent care. Hearing back from our patients is one way we can continue to improve our services. Please take a few minutes to complete the written survey that you may receive in the mail after your visit with us. Thank you!             Your Updated Medication List - Protect others around you: Learn how to safely use, store and throw away your medicines at www.disposemymeds.org.          This list is accurate as of 7/24/18 12:01 PM.  Always use your most recent med list.                   Brand Name Dispense Instructions for use Diagnosis    amiodarone 200 MG tablet    PACERONE/CODARONE    1 tablet    Take every other day    Ischemic cardiomyopathy, Chronic congestive heart failure, unspecified congestive heart failure type (H)       aspirin 81 MG tablet      Take 1 tablet by mouth daily.        B-12 2500 MCG Tabs      1 daily        carvedilol 12.5 MG tablet    COREG    1 tablet    Take 1/4 tab 2 times a day    Chronic congestive heart failure, unspecified congestive heart failure type (H), Ischemic cardiomyopathy       fish oil-omega-3 fatty acids 1000 MG capsule      Take 2 g by mouth daily        furosemide 80 MG tablet    LASIX    90 tablet    Take 1 tablet (80 mg) by mouth daily    Congestive heart failure, unspecified congestive heart failure chronicity, unspecified congestive heart failure type (H), Hypertension goal BP (blood pressure) < 140/90       iron 325 (65 Fe) MG tablet      Take 1 tablet by mouth daily        levothyroxine 75 MCG  tablet    SYNTHROID/LEVOTHROID    90 tablet    TAKE 1 TABLET BY MOUTH ONCE DAILY    Hypothyroidism, unspecified type       lisinopril 2.5 MG tablet    PRINIVIL/Zestril    90 tablet    TAKE 1 TABLET (2.5 MG) BY MOUTH DAILY    Acute ST elevation myocardial infarction (STEMI), unspecified artery (H)       MAGNESIUM OXIDE PO           Multiple vitamin Caps      1 daily        NITROSTAT SL      Place 0.4 mg under the tongue        potassium chloride SA 20 MEQ CR tablet    K-DUR/KLOR-CON M     Take 1 tablet by mouth daily        vitamin D3 2000 units Caps      1 daily        warfarin 5 MG tablet    COUMADIN    90 tablet    Take 1/2 tablet (2.5 mg ) daily or as directed by [facility] nurse    Atrial fibrillation (H)

## 2018-07-24 NOTE — LETTER
My Depression Action Plan  Name: Juan Antonio Sierra   Date of Birth 1935  Date: 7/24/2018    My doctor: Gianni Wright   My clinic: [address]  [place] [address]  [phone]          GREEN    ZONE   Good Control    What it looks like:     Things are going generally well. You have normal up s and down s. You may even feel depressed from time to time, but bad moods usually last less than a day.   What you need to do:  1. Continue to care for yourself (see self care plan)  2. Check your depression survival kit and update it as needed  3. Follow your physician s recommendations including any medication.  4. Do not stop taking medication unless you consult with your physician first.           YELLOW         ZONE Getting Worse    What it looks like:     Depression is starting to interfere with your life.     It may be hard to get out of bed; you may be starting to isolate yourself from others.    Symptoms of depression are starting to last most all day and this has happened for several days.     You may have suicidal thoughts but they are not constant.   What you need to do:     1. Call your care team, your response to treatment will improve if you keep your care team informed of your progress. Yellow periods are signs an adjustment may need to be made.     2. Continue your self-care, even if you have to fake it!    3. Talk to someone in your support network    4. Open up your depression survival kit           RED    ZONE Medical Alert - Get Help    What it looks like:     Depression is seriously interfering with your life.     You may experience these or other symptoms: You can t get out of bed most days, can t work or engage in other necessary activities, you have trouble taking care of basic hygiene, or basic responsibilities, thoughts of suicide or death that will not go away, self-injurious behavior.     What you need to do:  1. Call your care team and request  a same-day appointment. If they are not available (weekends or after hours) call your local crisis line, emergency room or 911.            Depression Self Care Plan / Survival Kit    Self-Care for Depression  Here s the deal. Your body and mind are really not as separate as most people think.  What you do and think affects how you feel and how you feel influences what you do and think. This means if you do things that people who feel good do, it will help you feel better.  Sometimes this is all it takes.  There is also a place for medication and therapy depending on how severe your depression is, so be sure to consult with your medical provider and/ or Behavioral Health Consultant if your symptoms are worsening or not improving.     In order to better manage my stress, I will:    Exercise  Get some form of exercise, every day. This will help reduce pain and release endorphins, the  feel good  chemicals in your brain. This is almost as good as taking antidepressants!  This is not the same as joining a gym and then never going! (they count on that by the way ) It can be as simple as just going for a walk or doing some gardening, anything that will get you moving.      Hygiene   Maintain good hygiene (Get out of bed in the morning, Make your bed, Brush your teeth, Take a shower, and Get dressed like you were going to work, even if you are unemployed).  If your clothes don't fit try to get ones that do.    Diet  I will strive to eat foods that are good for me, drink plenty of water, and avoid excessive sugar, caffeine, alcohol, and other mood-altering substances.  Some foods that are helpful in depression are: complex carbohydrates, B vitamins, flaxseed, fish or fish oil, fresh fruits and vegetables.    Psychotherapy  I agree to participate in Individual Therapy (if recommended).    Medication  If prescribed medications, I agree to take them.  Missing doses can result in serious side effects.  I understand that drinking  alcohol, or other illicit drug use, may cause potential side effects.  I will not stop my medication abruptly without first discussing it with my provider.    Staying Connected With Others  I will stay in touch with my friends, family members, and my primary care provider/team.    Use your imagination  Be creative.  We all have a creative side; it doesn t matter if it s oil painting, sand castles, or mud pies! This will also kick up the endorphins.    Witness Beauty  (AKA stop and smell the roses) Take a look outside, even in mid-winter. Notice colors, textures. Watch the squirrels and birds.     Service to others  Be of service to others.  There is always someone else in need.  By helping others we can  get out of ourselves  and remember the really important things.  This also provides opportunities for practicing all the other parts of the program.    Humor  Laugh and be silly!  Adjust your TV habits for less news and crime-drama and more comedy.    Control your stress  Try breathing deep, massage therapy, biofeedback, and meditation. Find time to relax each day.     My support system    Clinic Contact:  Phone number:    Contact 1:  Phone number:    Contact 2:  Phone number:    Faith/:  Phone number:    Therapist:  Phone number:    Local crisis center:    Phone number:    Other community support:  Phone number:

## 2018-07-25 DIAGNOSIS — E78.5 HYPERLIPIDEMIA LDL GOAL <100: ICD-10-CM

## 2018-07-25 DIAGNOSIS — E03.9 ACQUIRED HYPOTHYROIDISM: ICD-10-CM

## 2018-07-25 DIAGNOSIS — D49.9 PARANEOPLASTIC NEUROPATHY (H): ICD-10-CM

## 2018-07-25 DIAGNOSIS — G13.0 PARANEOPLASTIC NEUROPATHY (H): ICD-10-CM

## 2018-07-25 DIAGNOSIS — N18.30 CKD (CHRONIC KIDNEY DISEASE) STAGE 3, GFR 30-59 ML/MIN (H): ICD-10-CM

## 2018-07-25 LAB
ANION GAP SERPL CALCULATED.3IONS-SCNC: 7 MMOL/L (ref 3–14)
BASOPHILS # BLD AUTO: 0 10E9/L (ref 0–0.2)
BASOPHILS NFR BLD AUTO: 0.4 %
BUN SERPL-MCNC: 25 MG/DL (ref 7–30)
CALCIUM SERPL-MCNC: 9.2 MG/DL (ref 8.5–10.1)
CHLORIDE SERPL-SCNC: 104 MMOL/L (ref 94–109)
CHOLEST SERPL-MCNC: 212 MG/DL
CO2 SERPL-SCNC: 28 MMOL/L (ref 20–32)
CREAT SERPL-MCNC: 1.05 MG/DL (ref 0.66–1.25)
DIFFERENTIAL METHOD BLD: ABNORMAL
EOSINOPHIL # BLD AUTO: 0.4 10E9/L (ref 0–0.7)
EOSINOPHIL NFR BLD AUTO: 4.8 %
ERYTHROCYTE [DISTWIDTH] IN BLOOD BY AUTOMATED COUNT: 13.7 % (ref 10–15)
GFR SERPL CREATININE-BSD FRML MDRD: 68 ML/MIN/1.7M2
GLUCOSE SERPL-MCNC: 93 MG/DL (ref 70–99)
HCT VFR BLD AUTO: 36.9 % (ref 40–53)
HDLC SERPL-MCNC: 44 MG/DL
HGB BLD-MCNC: 12.4 G/DL (ref 13.3–17.7)
LDLC SERPL CALC-MCNC: 132 MG/DL
LYMPHOCYTES # BLD AUTO: 1.5 10E9/L (ref 0.8–5.3)
LYMPHOCYTES NFR BLD AUTO: 20 %
MCH RBC QN AUTO: 34.1 PG (ref 26.5–33)
MCHC RBC AUTO-ENTMCNC: 33.6 G/DL (ref 31.5–36.5)
MCV RBC AUTO: 101 FL (ref 78–100)
MONOCYTES # BLD AUTO: 0.9 10E9/L (ref 0–1.3)
MONOCYTES NFR BLD AUTO: 11.3 %
NEUTROPHILS # BLD AUTO: 4.9 10E9/L (ref 1.6–8.3)
NEUTROPHILS NFR BLD AUTO: 63.5 %
NONHDLC SERPL-MCNC: 168 MG/DL
PLATELET # BLD AUTO: 209 10E9/L (ref 150–450)
POTASSIUM SERPL-SCNC: 4.1 MMOL/L (ref 3.4–5.3)
RBC # BLD AUTO: 3.64 10E12/L (ref 4.4–5.9)
SODIUM SERPL-SCNC: 139 MMOL/L (ref 133–144)
T4 FREE SERPL-MCNC: 1.64 NG/DL (ref 0.76–1.46)
TRIGL SERPL-MCNC: 182 MG/DL
TSH SERPL DL<=0.005 MIU/L-ACNC: 0.18 MU/L (ref 0.4–4)
VIT B12 SERPL-MCNC: 1441 PG/ML (ref 193–986)
WBC # BLD AUTO: 7.7 10E9/L (ref 4–11)

## 2018-07-25 PROCEDURE — 84446 ASSAY OF VITAMIN E: CPT | Mod: 90 | Performed by: INTERNAL MEDICINE

## 2018-07-25 PROCEDURE — 80048 BASIC METABOLIC PNL TOTAL CA: CPT | Performed by: INTERNAL MEDICINE

## 2018-07-25 PROCEDURE — 36415 COLL VENOUS BLD VENIPUNCTURE: CPT | Performed by: INTERNAL MEDICINE

## 2018-07-25 PROCEDURE — 84439 ASSAY OF FREE THYROXINE: CPT | Performed by: INTERNAL MEDICINE

## 2018-07-25 PROCEDURE — 80061 LIPID PANEL: CPT | Performed by: INTERNAL MEDICINE

## 2018-07-25 PROCEDURE — 82607 VITAMIN B-12: CPT | Performed by: INTERNAL MEDICINE

## 2018-07-25 PROCEDURE — 99000 SPECIMEN HANDLING OFFICE-LAB: CPT | Performed by: INTERNAL MEDICINE

## 2018-07-25 PROCEDURE — 85025 COMPLETE CBC W/AUTO DIFF WBC: CPT | Performed by: INTERNAL MEDICINE

## 2018-07-25 PROCEDURE — 84443 ASSAY THYROID STIM HORMONE: CPT | Performed by: INTERNAL MEDICINE

## 2018-07-25 NOTE — PROGRESS NOTES
Thyroid is overactive.  Decrease Levothyroxine dose to 50 mcg daily.  Repeat your thyroid bloodwork (TSH with reflex T4) at the lab in 8 weeks.   1 month supply with 3 refills.    Normal electrolytes. Normal kidney function. Relatively stable cholesterol over the past year.     The rest of your labs are still pending.     Dr. Crook (covering for Dr. Wright)

## 2018-07-29 LAB
A-TOCOPHEROL VIT E SERPL-MCNC: 15.1 MG/L (ref 5.5–18)
BETA+GAMMA TOCOPHEROL SERPL-MCNC: 1.3 MG/L (ref 0–6)

## 2018-08-03 ENCOUNTER — TELEPHONE (OUTPATIENT)
Dept: FAMILY MEDICINE | Facility: CLINIC | Age: 83
End: 2018-08-03

## 2018-08-03 DIAGNOSIS — I10 HYPERTENSION GOAL BP (BLOOD PRESSURE) < 140/90: ICD-10-CM

## 2018-08-03 DIAGNOSIS — I50.9 CONGESTIVE HEART FAILURE, UNSPECIFIED CONGESTIVE HEART FAILURE CHRONICITY, UNSPECIFIED CONGESTIVE HEART FAILURE TYPE: ICD-10-CM

## 2018-08-03 DIAGNOSIS — E03.9 HYPOTHYROIDISM, UNSPECIFIED TYPE: ICD-10-CM

## 2018-08-03 RX ORDER — LEVOTHYROXINE SODIUM 50 UG/1
50 TABLET ORAL DAILY
Qty: 90 TABLET | Refills: 0 | Status: SHIPPED | OUTPATIENT
Start: 2018-08-03 | End: 2018-11-05

## 2018-08-03 NOTE — TELEPHONE ENCOUNTER
Patient notified of providers message as written.  Patient stated he knew that is it was supposed to be 50mcg but he was going to cut the 100mcg tabs in half.  Patient agreeable to new prescription for 50mcg.  Prescription sent and future lab order placed.   Belen Hummel RN

## 2018-08-03 NOTE — TELEPHONE ENCOUNTER
Patient asking for Levothyroxine 100mg, 3 month supply?    Dose was supposed to decreased to 50mcg daily. See result notes. Not sure if patient ever got the results below    Called patient but he was unavailable  Female picked up the call and will have patient return call to the RN hotline      Per 7/25/18 result notes:    Result Notes   Notes Recorded by Raina Crook MD on 7/30/2018 at 10:01 AM  Normal Vitamin E level.  ------    Notes Recorded by Raina Crook MD on 7/26/2018 at 11:09 AM  B12 level is fine.  Dr. Crook    ------    Notes Recorded by Raina Crook MD on 7/25/2018 at 1:08 PM  Thyroid is overactive.  Decrease Levothyroxine dose to 50 mcg daily.  Repeat your thyroid bloodwork (TSH with reflex T4) at the lab in 8 weeks.   1 month supply with 3 refills.    Normal electrolytes. Normal kidney function. Relatively stable cholesterol over the past year.     The rest of your labs are still pending.     Dr. Crook (covering for Dr. Wright)     Feroz Ling, RN

## 2018-08-03 NOTE — TELEPHONE ENCOUNTER
Reason for Call:  Medication or medication refill:    Do you use a [facility]?  Name of the pharmacy and phone number for the current request:    [id number] - [place], [place] - [address]  [address]  [place] [address]  Phone: [phone] Fax: [phone]      Name of the medication requested: levothyroxine (SYNTHROID/LEVOTHROID) 75 MCG tablet       Other request: Patient wants 100mg 3 month supply instead.     Can we leave a detailed message on this number? YES    Phone number patient can be reached at: Home number on file [phone] (home)    Best Time: any     Call taken on 8/3/2018 at 11:39 AM by Lonnie Josue

## 2018-08-06 RX ORDER — FUROSEMIDE 80 MG
TABLET ORAL
Qty: 90 TABLET | Refills: 0 | Status: SHIPPED | OUTPATIENT
Start: 2018-08-06 | End: 2019-04-26

## 2018-08-06 NOTE — TELEPHONE ENCOUNTER
"Pending Prescriptions:                       Disp   Refills    furosemide (LASIX) 80 MG tablet [Pharmacy*90 tab*0            Sig: TAKE 1 TABLET (80 MG) BY MOUTH DAILY    RN refilled medication per [facility] Refill Protocol.     Mar George RN    Requested Prescriptions   Pending Prescriptions Disp Refills     furosemide (LASIX) 80 MG tablet [Pharmacy Med Name: Furosemide Oral Tablet 80 MG] 90 tablet 0     Sig: TAKE 1 TABLET (80 MG) BY MOUTH DAILY    Diuretics (Including Combos) Protocol Passed    8/3/2018 12:01 PM       Passed - Blood pressure under 140/90 in past 12 months    BP Readings from Last 3 Encounters:   07/24/18 128/76   06/25/18 132/72   11/28/17 100/58                Passed - Recent (12 mo) or future (30 days) visit within the authorizing provider's specialty    Patient had office visit in the last 12 months or has a visit in the next 30 days with authorizing provider or within the authorizing provider's specialty.  See \"Patient Info\" tab in inbasket, or \"Choose Columns\" in Meds & Orders section of the refill encounter.           Passed - Patient is age 18 or older       Passed - Normal serum creatinine on file in past 12 months    Recent Labs   Lab Test  07/25/18   0956   CR  1.05             Passed - Normal serum potassium on file in past 12 months    Recent Labs   Lab Test  07/25/18   0956   POTASSIUM  4.1                   Passed - Normal serum sodium on file in past 12 months    Recent Labs   Lab Test  07/25/18   0956   NA  139                " Winter catheter inserted and hemodynamic measurements captured.

## 2018-08-09 ENCOUNTER — TELEPHONE (OUTPATIENT)
Dept: FAMILY MEDICINE | Facility: CLINIC | Age: 83
End: 2018-08-09

## 2018-08-09 NOTE — TELEPHONE ENCOUNTER
It is dependent upon what this is. If it's a skin tag or a minor sebaceous cyst type of deal we can deal with this in the clinic. If it's larger or something else we would need to refer patient to the dermatologist.    Gianni Wright MD

## 2018-08-09 NOTE — TELEPHONE ENCOUNTER
Reason for Call:  Growth    Detailed comments: patient states that he has a growth growing on his left arm underneath the armpit. Patient states it keeps rubbing against his body and is asking for it to be taken off. Patient is asking where should he go, or what he should do? Please contact patient    Phone Number Patient can be reached at: Home number on file [phone] (home)    Best Time: any time    Can we leave a detailed message on this number? YES    Call taken on 8/9/2018 at 1:54 PM by Lashawn French

## 2018-08-10 ENCOUNTER — OFFICE VISIT (OUTPATIENT)
Dept: FAMILY MEDICINE | Facility: CLINIC | Age: 83
End: 2018-08-10
Payer: COMMERCIAL

## 2018-08-10 VITALS
TEMPERATURE: 98.2 F | OXYGEN SATURATION: 96 % | HEIGHT: 69 IN | DIASTOLIC BLOOD PRESSURE: 60 MMHG | RESPIRATION RATE: 20 BRPM | HEART RATE: 90 BPM | SYSTOLIC BLOOD PRESSURE: 110 MMHG | BODY MASS INDEX: 23.58 KG/M2 | WEIGHT: 159.2 LBS

## 2018-08-10 DIAGNOSIS — L91.8 SKIN TAG: Primary | ICD-10-CM

## 2018-08-10 PROCEDURE — 17110 DESTRUCTION B9 LES UP TO 14: CPT | Performed by: INTERNAL MEDICINE

## 2018-08-10 ASSESSMENT — PAIN SCALES - GENERAL: PAINLEVEL: NO PAIN (0)

## 2018-08-10 NOTE — PROGRESS NOTES
SUBJECTIVE:   Juan Antonio Sierra is a 82 year old male who presents to clinic today for the following health issues:      Patient is present with spouse    Patient is present for large lesion located under left axilla that was reported to have been there for a long time. It's irritable from being scratched at times and seems in the way at times but otherwise not a problem.    Problem list and histories reviewed & adjusted, as indicated.  Additional history: as documented    Patient Active Problem List   Diagnosis     Atrial fibrillation (H)     AMI (acute myocardial infarction)     Arthritis     Hyperlipidemia LDL goal <100     Hypertension goal BP (blood pressure) < 140/90     S/P CABG (coronary artery bypass graft)     Health Care Home     Transient cerebral ischemia     Ex-smoker     Generalized weakness     Paraneoplastic neuropathy (H)     Pulmonary nodule     High risk medications (not anticoagulants) long-term use     Congestive heart failure, unspecified congestive heart failure chronicity, unspecified congestive heart failure type (H)     Major depressive disorder, recurrent episode, moderate (H)     Other emphysema (H)     Ischemic cardiomyopathy     CKD (chronic kidney disease) stage 3, GFR 30-59 ml/min     IPF (idiopathic pulmonary fibrosis) (H)     Acquired hypothyroidism     Bilateral carpal tunnel syndrome     Past Surgical History:   Procedure Laterality Date     CARPAL TUNNEL RELEASE RT/LT Left 12/06/2017    Rogelio Castillo MD at [facility] (Left CTR revision)     CATHETER, ABLATION  July 2012    for atrial fibrillation      CORONARY ARTERY BYPASS  1983 (5v)  -  1998 (3v)    cabg @ [facility] and a redo     IMPLANT PACEMAKER  6/2012     Laminectomy - T11 to L5  aug 26th, 2015    [facility]     STENT, CORONARY, TREE  6/3/2011    stenting of the LAD by the mammary artery graft in 2011     TONSILLECTOMY         Social History   Substance Use Topics     Smoking status: Current Some Day Smoker      Types: Cigars     Start date: 1/1/1947     Last attempt to quit: 8/18/2015     Smokeless tobacco: Never Used      Comment: restarted smoking cigars     Alcohol use Yes      Comment: 0-1 drinks of brigitte in evening     Family History   Problem Relation Age of Onset     Respiratory Mother      chf     Cerebrovascular Disease Mother 86     HEART DISEASE Father      Neurologic Disorder Sister      migraines/ unknown age         Current Outpatient Prescriptions   Medication Sig Dispense Refill     amiodarone (PACERONE/CODARONE) 200 MG tablet Take every other day 1 tablet 0     aspirin 81 MG tablet Take 1 tablet by mouth daily.       carvedilol (COREG) 12.5 MG tablet Take 1/4 tab 2 times a day 1 tablet 0     Cholecalciferol (VITAMIN D3) 2000 UNITS CAPS 1 daily       Cyanocobalamin (B-12) 2500 MCG TABS 1 daily       Ferrous Sulfate (IRON) 325 (65 FE) MG tablet Take 1 tablet by mouth daily       fish oil-omega-3 fatty acids (FISH OIL) 1000 MG capsule Take 2 g by mouth daily       furosemide (LASIX) 80 MG tablet TAKE 1 TABLET (80 MG) BY MOUTH DAILY 90 tablet 0     levothyroxine (SYNTHROID/LEVOTHROID) 50 MCG tablet Take 1 tablet (50 mcg) by mouth daily Due for labs end of Sept 90 tablet 0     lisinopril (PRINIVIL/ZESTRIL) 2.5 MG tablet TAKE 1 TABLET (2.5 MG) BY MOUTH DAILY 90 tablet 2     MAGNESIUM OXIDE PO        Multiple vitamin CAPS 1 daily       Nitroglycerin (NITROSTAT SL) Place 0.4 mg under the tongue       potassium chloride SA (K-DUR,KLOR-CON M) 20 MEQ tablet Take 1 tablet by mouth daily       warfarin (COUMADIN) 5 MG tablet Take 1/2 tablet (2.5 mg ) daily or as directed by ACC nurse 90 tablet 1     Allergies   Allergen Reactions     Hay Fever & [A.R.M.]      Recent Labs   Lab Test  07/25/18   0956  07/02/18   1348 01/03/18 11/28/17   1320  07/20/17   1134   11/08/16   1102  08/09/12   A1C   --    --    --    --    --    --    --    --   5.8   LDL  132*   --    --    --   142*   --   103*   < >  81   HDL  44   --     "--    --   48   --   57   < >  42   TRIG  182*   --    --    --   125   --   106   < >  92   ALT   --    --   15  21  24   --   51   < >  35   CR  1.05   --   1.07  1.21  1.08   < >  1.08   < >  0.9   GFRESTIMATED  68  81  >60  57*  66   < >  66   < >   --    GFRESTBLACK  82  >90  >60  69  79   < >  79   < >   --    POTASSIUM  4.1   --   4.2  4.5  4.0   < >  4.2   < >  4.4   TSH  0.18*   --   0.45  0.81  2.29   --   2.75   < >   --     < > = values in this interval not displayed.      BP Readings from Last 3 Encounters:   08/10/18 110/60   07/24/18 128/76   06/25/18 132/72    Wt Readings from Last 3 Encounters:   08/10/18 72.2 kg (159 lb 3.2 oz)   07/24/18 70.9 kg (156 lb 6.4 oz)   06/25/18 73 kg (161 lb)                  Labs reviewed in EPIC    Reviewed and updated as needed this visit by clinical staff  Tobacco  Allergies  Meds  Med Hx  Surg Hx  Fam Hx  Soc Hx      Reviewed and updated as needed this visit by Provider         ROS:  Constitutional, HEENT, cardiovascular, pulmonary, GI, , musculoskeletal, neuro, skin, endocrine and psych systems are negative, except as otherwise noted.    This document serves as a record of the services and decisions personally performed and made by Gianni Wright MD. It was created on her behalf by Albert Link, a trained medical scribe. The creation of this document is based on the provider's statements to the medical scribe.  Albert Link August 10, 2018 11:32 AM      OBJECTIVE:     /60  Pulse 90  Temp 98.2  F (36.8  C) (Oral)  Resp 20  Ht 1.753 m (5' 9\")  Wt 72.2 kg (159 lb 3.2 oz)  SpO2 96%  BMI 23.51 kg/m2  Body mass index is 23.51 kg/(m^2).  GENERAL: healthy, alert and no distress  EYES: Eyes grossly normal to inspection, PERRL and conjunctivae and sclerae normal  SKIN: no suspicious lesions or rashes and large area of seborrhea keratosis located under left axilla, it's brownish in color and roughly 8-9 millimeters in circumferentially. Consent form " "signed. Cleaned with alcohol .Removed with sharp scissors without complication. Bacitracin  and band aid applied  NEURO: Normal strength and tone, mentation intact and speech normal  PSYCH: mentation appears normal, affect normal/bright    Diagnostic Test Results:  none     ASSESSMENT/PLAN:     Tobacco Cessation:   reports that he has been smoking Cigars.  He started smoking about 71 years ago. He has never used smokeless tobacco.  Tobacco Cessation Action Plan: not discussed.     BMI:   Estimated body mass index is 23.51 kg/(m^2) as calculated from the following:    Height as of this encounter: 1.753 m (5' 9\").    Weight as of this encounter: 72.2 kg (159 lb 3.2 oz).     seborrhea keratosis  large area of seborrhea keratosis located under left axilla removed with sharp scissors today without complication. Patient will return to clinic if persisting or worsening.        Patient instructions discussed with patient    The information in this document, created by the medical scribe for me, accurately reflects the services I personally performed and the decisions made by me. I have reviewed and approved this document for accuracy prior to leaving the patient care area.  August 10, 2018 11:54 AM      Gianni Wright MD  [facility]    "

## 2018-08-10 NOTE — MR AVS SNAPSHOT
After Visit Summary   8/10/2018    Juan Antonio Sierra    MRN: [id number]           Patient Information     Date Of Birth          1935        Visit Information        Provider Department      8/10/2018 10:50 AM Gianni Wright MD [facility]        Care Instructions    [facility]    If you have any questions regarding to your visit please contact your care team:     Team Pink:   Clinic Hours Telephone Number   Internal Medicine:  Dr. Raina Tran NP       7am-7pm  Monday - Thursday   7am-5pm  Fridays  [phone]  (Appointment scheduling available 24/7)    Questions about your recent visit?  Team Line  [phone]   Urgent Care - [place] - 11am-9pm Monday-Friday Saturday-Sunday- 9am-5pm   [place] - 5pm-9pm Monday-Friday Saturday-Sunday- 9am-5pm  [phone] - [facility] [place]  [phone] - [place]       What options do I have for a visit other than an office visit? We offer electronic visits (e-visits) and telephone visits, when medically appropriate.  Please check with your medical insurance to see if these types of visits are covered, as you will be responsible for any charges that are not paid by your insurance.      You can use ImageBrief (secure electronic communication) to access to your chart, send your primary care provider a message, or make an appointment. Ask a team member how to get started.     For a price quote for your services, please call our Consumer Price Line at [phone] or our Imaging Cost estimation line at [phone] (for imaging tests).  Janeth Davenport CMA             Follow-ups after your visit        Who to contact     If you have questions or need follow up information about today's clinic visit or your schedule please contact [facility] directly at [phone].  Normal or non-critical lab and imaging results will be communicated to you by ImageBrief,  "letter or phone within 4 business days after the clinic has received the results. If you do not hear from us within 7 days, please contact the clinic through Kontiki or phone. If you have a critical or abnormal lab result, we will notify you by phone as soon as possible.  Submit refill requests through Kontiki or call your pharmacy and they will forward the refill request to us. Please allow 3 business days for your refill to be completed.          Additional Information About Your Visit        [employer] Information     Kontiki gives you secure access to your electronic health record. If you see a primary care provider, you can also send messages to your care team and make appointments. If you have questions, please call your primary care clinic.  If you do not have a primary care provider, please call [phone] and they will assist you.        Care EveryWhere ID     This is your Care EveryWhere ID. This could be used by other organizations to access your [facility] medical records  [id number]        Your Vitals Were     Pulse Temperature Respirations Height Pulse Oximetry BMI (Body Mass Index)    90 98.2  F (36.8  C) (Oral) 20 5' 9\" (1.753 m) 96% 23.51 kg/m2       Blood Pressure from Last 3 Encounters:   08/10/18 110/60   07/24/18 128/76   06/25/18 132/72    Weight from Last 3 Encounters:   08/10/18 159 lb 3.2 oz (72.2 kg)   07/24/18 156 lb 6.4 oz (70.9 kg)   06/25/18 161 lb (73 kg)              Today, you had the following     No orders found for display       Primary Care Provider Office Phone # Fax #    Gianni Wright [phone] [address] [place] [address]        Equal Access to Services     [facility] AH: Hadii terrell Herman, wareddda luqadaha, qaybta kaalmada alondra, edgard richmond. So [facility] [phone].    ATENCIÓN: Si habla español, tiene a ness disposición servicios gratuitos de asistencia lingüística. Llame al [phone].    We " comply with applicable federal civil rights laws and [place] laws. We do not discriminate on the basis of race, color, national origin, age, disability, sex, sexual orientation, or gender identity.            Thank you!     Thank you for choosing [facility]  for your care. Our goal is always to provide you with excellent care. Hearing back from our patients is one way we can continue to improve our services. Please take a few minutes to complete the written survey that you may receive in the mail after your visit with us. Thank you!             Your Updated Medication List - Protect others around you: Learn how to safely use, store and throw away your medicines at www.disposemymeds.org.          This list is accurate as of 8/10/18 12:09 PM.  Always use your most recent med list.                   Brand Name Dispense Instructions for use Diagnosis    amiodarone 200 MG tablet    PACERONE/CODARONE    1 tablet    Take every other day    Ischemic cardiomyopathy, Chronic congestive heart failure, unspecified congestive heart failure type (H)       aspirin 81 MG tablet      Take 1 tablet by mouth daily.        B-12 2500 MCG Tabs      1 daily        carvedilol 12.5 MG tablet    COREG    1 tablet    Take 1/4 tab 2 times a day    Chronic congestive heart failure, unspecified congestive heart failure type (H), Ischemic cardiomyopathy       fish oil-omega-3 fatty acids 1000 MG capsule      Take 2 g by mouth daily        furosemide 80 MG tablet    LASIX    90 tablet    TAKE 1 TABLET (80 MG) BY MOUTH DAILY    Congestive heart failure, unspecified congestive heart failure chronicity, unspecified congestive heart failure type (H), Hypertension goal BP (blood pressure) < 140/90       iron 325 (65 Fe) MG tablet      Take 1 tablet by mouth daily        levothyroxine 50 MCG tablet    SYNTHROID/LEVOTHROID    90 tablet    Take 1 tablet (50 mcg) by mouth daily Due for labs end of Sept    Hypothyroidism, unspecified type        lisinopril 2.5 MG tablet    PRINIVIL/Zestril    90 tablet    TAKE 1 TABLET (2.5 MG) BY MOUTH DAILY    Acute ST elevation myocardial infarction (STEMI), unspecified artery (H)       MAGNESIUM OXIDE PO           Multiple vitamin Caps      1 daily        NITROSTAT SL      Place 0.4 mg under the tongue        potassium chloride SA 20 MEQ CR tablet    K-DUR/KLOR-CON M     Take 1 tablet by mouth daily        vitamin D3 2000 units Caps      1 daily        warfarin 5 MG tablet    COUMADIN    90 tablet    Take 1/2 tablet (2.5 mg ) daily or as directed by ACC nurse    Atrial fibrillation (H)

## 2018-08-10 NOTE — PATIENT INSTRUCTIONS
[facility]    If you have any questions regarding to your visit please contact your care team:     Team Pink:   Clinic Hours Telephone Number   Internal Medicine:  Dr. Raina Tran NP       7am-7pm  Monday - Thursday   7am-5pm  Fridays  [phone]  (Appointment scheduling available 24/7)    Questions about your recent visit?  Team Line  [phone]   Urgent Care - [place] - 11am-9pm Monday-Friday Saturday-Sunday- 9am-5pm   [place] - 5pm-9pm Monday-Friday Saturday-Sunday- 9am-5pm  [phone] - Ashley Miramontes  [phone] - [place]       What options do I have for a visit other than an office visit? We offer electronic visits (e-visits) and telephone visits, when medically appropriate.  Please check with your medical insurance to see if these types of visits are covered, as you will be responsible for any charges that are not paid by your insurance.      You can use Speak With Me (secure electronic communication) to access to your chart, send your primary care provider a message, or make an appointment. Ask a team member how to get started.     For a price quote for your services, please call our Consumer Price Line at [phone] or our Imaging Cost estimation line at [phone] (for imaging tests).  Janeth Davenport CMA

## 2018-08-11 ASSESSMENT — PATIENT HEALTH QUESTIONNAIRE - PHQ9: SUM OF ALL RESPONSES TO PHQ QUESTIONS 1-9: 11

## 2018-10-26 DIAGNOSIS — I50.9 CHRONIC CONGESTIVE HEART FAILURE (H): ICD-10-CM

## 2018-10-26 RX ORDER — CARVEDILOL 12.5 MG/1
TABLET ORAL
Qty: 60 TABLET | Refills: 8 | Status: SHIPPED | OUTPATIENT
Start: 2018-10-26

## 2018-11-03 ENCOUNTER — MEDICAL CORRESPONDENCE (OUTPATIENT)
Dept: HEALTH INFORMATION MANAGEMENT | Facility: CLINIC | Age: 83
End: 2018-11-03

## 2018-11-05 DIAGNOSIS — E03.9 HYPOTHYROIDISM, UNSPECIFIED TYPE: ICD-10-CM

## 2018-11-05 RX ORDER — LEVOTHYROXINE SODIUM 50 UG/1
TABLET ORAL
Qty: 30 TABLET | Refills: 0 | Status: SHIPPED | OUTPATIENT
Start: 2018-11-05 | End: 2018-12-05

## 2018-11-05 NOTE — TELEPHONE ENCOUNTER
MA - call patient and schedule a lab only appointment. Medication has been filled for 30 days only    Ruth Nichols RN - BC

## 2018-11-05 NOTE — TELEPHONE ENCOUNTER
Spoke to patient and informed him of message from provider. Patient has lab appointment 11/7/2018.  Fariha MURRIETA CMA ([facility])

## 2018-11-05 NOTE — TELEPHONE ENCOUNTER
It has been too long since last office visit where levothyroxine dose was changed and patient is due for a follow up laboratory recheck . We can extend refill but only for one month. Please help see to it that appointment is generated with the laboratory . We can't do this again. Further refill requests without appointment should be declined.    Gianni Wright MD

## 2018-11-05 NOTE — TELEPHONE ENCOUNTER
Pending Prescriptions:                       Disp   Refills    levothyroxine (SYNTHROID/LEVOTHROID) 50 M*90 tab*0            Sig: TAKE 1 TABLET BY MOUTH ONCE DAILY. DUE FOR LABS           AT THE END OF SEPTEMBER.    Routing refill request to provider for review/approval because:  Labs not current:  Dose change 7/25/18, due for lab recheck 8 weeks later.    Mar George RN

## 2018-11-06 DIAGNOSIS — I21.3 ACUTE ST ELEVATION MYOCARDIAL INFARCTION (STEMI), UNSPECIFIED ARTERY (H): ICD-10-CM

## 2018-11-06 RX ORDER — LISINOPRIL 2.5 MG/1
TABLET ORAL
Qty: 90 TABLET | Refills: 1 | Status: SHIPPED | OUTPATIENT
Start: 2018-11-06 | End: 2019-05-13

## 2018-11-07 DIAGNOSIS — Z79.899 ENCOUNTER FOR MONITORING AMIODARONE THERAPY: Primary | ICD-10-CM

## 2018-11-07 DIAGNOSIS — Z91.89 AT RISK FOR AMIODARONE TOXICITY WITH LONG TERM USE: ICD-10-CM

## 2018-11-07 DIAGNOSIS — E03.9 ACQUIRED HYPOTHYROIDISM: ICD-10-CM

## 2018-11-07 DIAGNOSIS — Z79.899 AT RISK FOR AMIODARONE TOXICITY WITH LONG TERM USE: ICD-10-CM

## 2018-11-07 DIAGNOSIS — E03.9 HYPOTHYROIDISM, UNSPECIFIED TYPE: ICD-10-CM

## 2018-11-07 DIAGNOSIS — Z51.81 ENCOUNTER FOR MONITORING AMIODARONE THERAPY: Primary | ICD-10-CM

## 2018-11-07 LAB
AST SERPL W P-5'-P-CCNC: 25 U/L (ref 0–45)
TSH SERPL DL<=0.005 MIU/L-ACNC: 0.47 MU/L (ref 0.4–4)

## 2018-11-07 PROCEDURE — 84443 ASSAY THYROID STIM HORMONE: CPT | Performed by: INTERNAL MEDICINE

## 2018-11-07 PROCEDURE — 84450 TRANSFERASE (AST) (SGOT): CPT | Performed by: INTERNAL MEDICINE

## 2018-11-07 PROCEDURE — 36415 COLL VENOUS BLD VENIPUNCTURE: CPT | Performed by: INTERNAL MEDICINE

## 2018-12-05 DIAGNOSIS — E03.9 HYPOTHYROIDISM, UNSPECIFIED TYPE: ICD-10-CM

## 2018-12-05 RX ORDER — LEVOTHYROXINE SODIUM 50 UG/1
TABLET ORAL
Qty: 90 TABLET | Refills: 1 | Status: SHIPPED | OUTPATIENT
Start: 2018-12-05 | End: 2019-06-05

## 2018-12-05 NOTE — TELEPHONE ENCOUNTER
Requested Prescriptions   Pending Prescriptions Disp Refills     levothyroxine (SYNTHROID/LEVOTHROID) 50 MCG tablet 30 tablet 0    There is no refill protocol information for this order        Last Written Prescription Date:  11/5/2018  Last Fill Quantity: 30,  # refills: 0   Last office visit: 8/10/2018 with prescribing provider:  Kyle Estrada Office Visit:    Requesting 90 day supply

## 2019-01-04 ENCOUNTER — TELEPHONE (OUTPATIENT)
Dept: FAMILY MEDICINE | Facility: CLINIC | Age: 84
End: 2019-01-04

## 2019-01-04 NOTE — TELEPHONE ENCOUNTER
Reason for Call:  Other call back    Detailed comments: Patient had a lab test done on 07/25/2018 patient is wanting result sent to the [facility] Fax number is [phone] Thank you     Phone Number Patient can be reached at: [phone]    Best Time: any    Can we leave a detailed message on this number? YES    Call taken on 1/4/2019 at 1:37 PM by Marely Polanco

## 2019-04-26 DIAGNOSIS — I10 HYPERTENSION GOAL BP (BLOOD PRESSURE) < 140/90: ICD-10-CM

## 2019-04-26 DIAGNOSIS — I50.9 CONGESTIVE HEART FAILURE (H): ICD-10-CM

## 2019-04-26 RX ORDER — FUROSEMIDE 80 MG
TABLET ORAL
Qty: 90 TABLET | Refills: 0 | Status: SHIPPED | OUTPATIENT
Start: 2019-04-26

## 2019-05-13 DIAGNOSIS — I21.3 ACUTE ST ELEVATION MYOCARDIAL INFARCTION (STEMI), UNSPECIFIED ARTERY (H): ICD-10-CM

## 2019-05-14 RX ORDER — LISINOPRIL 2.5 MG/1
TABLET ORAL
Qty: 90 TABLET | Refills: 0 | Status: SHIPPED | OUTPATIENT
Start: 2019-05-14

## 2019-06-05 DIAGNOSIS — E03.9 HYPOTHYROIDISM, UNSPECIFIED TYPE: ICD-10-CM

## 2019-06-05 RX ORDER — LEVOTHYROXINE SODIUM 50 UG/1
TABLET ORAL
Qty: 90 TABLET | Refills: 0 | Status: SHIPPED | OUTPATIENT
Start: 2019-06-05

## 2019-07-15 ENCOUNTER — TELEPHONE (OUTPATIENT)
Dept: FAMILY MEDICINE | Facility: CLINIC | Age: 84
End: 2019-07-15

## 2019-07-15 NOTE — TELEPHONE ENCOUNTER
Called patient to complete a PHQ-9, patient declined to verified  and declined PHQ-9  HENRIQUE Umana

## 2019-08-21 DIAGNOSIS — I21.3 ACUTE ST ELEVATION MYOCARDIAL INFARCTION (STEMI), UNSPECIFIED ARTERY (H): ICD-10-CM

## 2019-08-21 DIAGNOSIS — E03.9 HYPOTHYROIDISM, UNSPECIFIED TYPE: ICD-10-CM

## 2019-08-23 NOTE — TELEPHONE ENCOUNTER
Left patient VM to return call to pink team to schedule for physical  Hli KETTY Bello on 8/23/2019 at 3:06 PM

## 2019-08-26 NOTE — TELEPHONE ENCOUNTER
Spoke to patient and informed him of message. Patient states he broke his tailbone and is hard to get out right now. He also states he sees another provider with [facility]. He spoke to them 5 minutes ago and will wait to see if they will fill it. Please disregard at this time.  Fariha MURRIETA CMA ([facility])

## 2019-08-27 RX ORDER — LEVOTHYROXINE SODIUM 50 UG/1
TABLET ORAL
Qty: 90 TABLET | Refills: 0
Start: 2019-08-27

## 2019-08-27 RX ORDER — LISINOPRIL 2.5 MG/1
TABLET ORAL
Qty: 90 TABLET | Refills: 0
Start: 2019-08-27

## 2019-09-12 ENCOUNTER — TELEPHONE (OUTPATIENT)
Dept: FAMILY MEDICINE | Facility: CLINIC | Age: 84
End: 2019-09-12

## 2019-09-12 NOTE — TELEPHONE ENCOUNTER
Called patient and he is seeing a new provider at [facility] and is no longer receiving care here at [facility].     Rabia LOONEY CMA ([facility])

## 2019-10-10 ENCOUNTER — MYC MEDICAL ADVICE (OUTPATIENT)
Dept: FAMILY MEDICINE | Facility: CLINIC | Age: 84
End: 2019-10-10

## 2019-10-10 NOTE — LETTER
October 14, 2019          [place],  [address]  [place] [address]        Dr. Kyle Richard wanted me to reach out to check in with you. He asked me to message you to request that you complete the Patient Health Questionnaire so he can better understand if you treatment is helping with your moods. I've attached it for you to this message. Please call any time if you have any questions. Our number is: [phone]. We really care about you and want to help you in every way possible to feel better.     Thank you and we look forward to hearing from you,    HV

## 2019-10-14 NOTE — TELEPHONE ENCOUNTER
Sent letter to patient to complete. postponing for two week. Please complete pHQ9 if not return in two weeks over the phone

## 2019-11-13 ASSESSMENT — PATIENT HEALTH QUESTIONNAIRE - PHQ9: SUM OF ALL RESPONSES TO PHQ QUESTIONS 1-9: 10

## 2019-11-13 NOTE — TELEPHONE ENCOUNTER
Contacted patient completed PHQ-9 score was a 10 patient states he will not be coming here anymore due to ability to get here, he states he wanted Dr Wright to know that he is the best doctor he has ever had and thank you for everything you have done for me and my wife. He states that his wife has passed away and he is feeling some depression and having issues with sleeping but he states he is not suicidal he knows he is older and his time will come eventually and does not want to rush anything.   LS

## 2019-12-02 DIAGNOSIS — I10 HYPERTENSION GOAL BP (BLOOD PRESSURE) < 140/90: ICD-10-CM

## 2019-12-02 DIAGNOSIS — I50.9 CONGESTIVE HEART FAILURE (H): ICD-10-CM

## 2019-12-03 RX ORDER — FUROSEMIDE 80 MG
TABLET ORAL
Qty: 90 TABLET | Refills: 0 | OUTPATIENT
Start: 2019-12-03

## 2020-02-23 ENCOUNTER — HEALTH MAINTENANCE LETTER (OUTPATIENT)
Age: 85
End: 2020-02-23

## 2020-03-13 DIAGNOSIS — I50.9 CHRONIC CONGESTIVE HEART FAILURE (H): ICD-10-CM

## 2020-03-16 RX ORDER — CARVEDILOL 12.5 MG/1
TABLET ORAL
Qty: 60 TABLET | Refills: 7 | OUTPATIENT
Start: 2020-03-16

## 2020-03-16 NOTE — TELEPHONE ENCOUNTER
Patient now goes to [facility]. Please disregard refill request.  Fariha MURRIETA CMA ([facility])

## 2020-10-14 NOTE — TELEPHONE ENCOUNTER
"On top years 3/8 inch diameter     Per patient, he has had a mass \"on top\" of the skin\" that is 3/8 of an inch in diameter for several years now.  He stated that Dr. Wright has seen it before but does not recall when he last had Dr. Wright look at it (unable to find any notes in chart regarding assessment of this)  There has been no changes but it has been rubbing on on his body and wondering if he can have it removed.    Appointment made with Dr. Wright for today at 1050    Feroz Ling RN    " peripheral edema/dyspnea on exertion/orthopnea

## 2020-12-13 ENCOUNTER — HEALTH MAINTENANCE LETTER (OUTPATIENT)
Age: 85
End: 2020-12-13

## 2021-04-17 ENCOUNTER — HEALTH MAINTENANCE LETTER (OUTPATIENT)
Age: 86
End: 2021-04-17

## 2021-09-26 ENCOUNTER — HEALTH MAINTENANCE LETTER (OUTPATIENT)
Age: 86
End: 2021-09-26

## 2023-01-01 ENCOUNTER — PATIENT OUTREACH (OUTPATIENT)
Dept: CARE COORDINATION | Facility: CLINIC | Age: 88
End: 2023-01-01

## 2023-01-08 ENCOUNTER — HEALTH MAINTENANCE LETTER (OUTPATIENT)
Age: 88
End: 2023-01-08

## 2024-01-01 ENCOUNTER — PATIENT OUTREACH (OUTPATIENT)
Dept: CARE COORDINATION | Facility: CLINIC | Age: 89
End: 2024-01-01